# Patient Record
Sex: MALE | Race: WHITE | Employment: FULL TIME | URBAN - METROPOLITAN AREA
[De-identification: names, ages, dates, MRNs, and addresses within clinical notes are randomized per-mention and may not be internally consistent; named-entity substitution may affect disease eponyms.]

---

## 2021-12-06 ENCOUNTER — OFFICE VISIT (OUTPATIENT)
Dept: FAMILY MEDICINE CLINIC | Age: 26
End: 2021-12-06
Payer: COMMERCIAL

## 2021-12-06 VITALS
WEIGHT: 234.3 LBS | RESPIRATION RATE: 18 BRPM | BODY MASS INDEX: 31.74 KG/M2 | OXYGEN SATURATION: 100 % | SYSTOLIC BLOOD PRESSURE: 130 MMHG | DIASTOLIC BLOOD PRESSURE: 80 MMHG | HEIGHT: 72 IN | TEMPERATURE: 98 F | HEART RATE: 72 BPM

## 2021-12-06 DIAGNOSIS — Z13.1 SCREENING FOR DIABETES MELLITUS: ICD-10-CM

## 2021-12-06 DIAGNOSIS — F32.A DEPRESSION, UNSPECIFIED DEPRESSION TYPE: Primary | ICD-10-CM

## 2021-12-06 DIAGNOSIS — Z13.220 SCREENING, LIPID: ICD-10-CM

## 2021-12-06 PROCEDURE — G8427 DOCREV CUR MEDS BY ELIG CLIN: HCPCS | Performed by: FAMILY MEDICINE

## 2021-12-06 PROCEDURE — 1036F TOBACCO NON-USER: CPT | Performed by: FAMILY MEDICINE

## 2021-12-06 PROCEDURE — G8417 CALC BMI ABV UP PARAM F/U: HCPCS | Performed by: FAMILY MEDICINE

## 2021-12-06 PROCEDURE — G8484 FLU IMMUNIZE NO ADMIN: HCPCS | Performed by: FAMILY MEDICINE

## 2021-12-06 PROCEDURE — 99204 OFFICE O/P NEW MOD 45 MIN: CPT | Performed by: FAMILY MEDICINE

## 2021-12-06 RX ORDER — ARIPIPRAZOLE 2 MG/1
2 TABLET ORAL DAILY
COMMUNITY
End: 2021-12-06

## 2021-12-06 RX ORDER — FLUOXETINE HYDROCHLORIDE 20 MG/1
20 CAPSULE ORAL DAILY
Qty: 90 CAPSULE | Refills: 1 | Status: SHIPPED
Start: 2021-12-06 | End: 2022-06-01

## 2021-12-06 RX ORDER — FLUOXETINE HYDROCHLORIDE 20 MG/1
CAPSULE ORAL
COMMUNITY
End: 2021-12-06 | Stop reason: SDUPTHER

## 2021-12-06 RX ORDER — ARIPIPRAZOLE 2 MG/1
2 TABLET ORAL DAILY
Qty: 30 TABLET | Status: CANCELLED | OUTPATIENT
Start: 2021-12-06

## 2021-12-06 SDOH — ECONOMIC STABILITY: FOOD INSECURITY: WITHIN THE PAST 12 MONTHS, YOU WORRIED THAT YOUR FOOD WOULD RUN OUT BEFORE YOU GOT MONEY TO BUY MORE.: NEVER TRUE

## 2021-12-06 SDOH — ECONOMIC STABILITY: FOOD INSECURITY: WITHIN THE PAST 12 MONTHS, THE FOOD YOU BOUGHT JUST DIDN'T LAST AND YOU DIDN'T HAVE MONEY TO GET MORE.: NEVER TRUE

## 2021-12-06 ASSESSMENT — COLUMBIA-SUICIDE SEVERITY RATING SCALE - C-SSRS
5. HAVE YOU STARTED TO WORK OUT OR WORKED OUT THE DETAILS OF HOW TO KILL YOURSELF? DO YOU INTEND TO CARRY OUT THIS PLAN?: NO
2. HAVE YOU ACTUALLY HAD ANY THOUGHTS OF KILLING YOURSELF?: NO
3. HAVE YOU BEEN THINKING ABOUT HOW YOU MIGHT KILL YOURSELF?: NO
1. WITHIN THE PAST MONTH, HAVE YOU WISHED YOU WERE DEAD OR WISHED YOU COULD GO TO SLEEP AND NOT WAKE UP?: YES
4. HAVE YOU HAD THESE THOUGHTS AND HAD SOME INTENTION OF ACTING ON THEM?: NO
6. HAVE YOU EVER DONE ANYTHING, STARTED TO DO ANYTHING, OR PREPARED TO DO ANYTHING TO END YOUR LIFE?: NO

## 2021-12-06 ASSESSMENT — ENCOUNTER SYMPTOMS
EYE REDNESS: 0
RHINORRHEA: 0
SHORTNESS OF BREATH: 0
SINUS PAIN: 0
COUGH: 0
GASTROINTESTINAL NEGATIVE: 1
PHOTOPHOBIA: 0
EYE DISCHARGE: 0

## 2021-12-06 ASSESSMENT — SOCIAL DETERMINANTS OF HEALTH (SDOH): HOW HARD IS IT FOR YOU TO PAY FOR THE VERY BASICS LIKE FOOD, HOUSING, MEDICAL CARE, AND HEATING?: NOT HARD AT ALL

## 2021-12-06 ASSESSMENT — PATIENT HEALTH QUESTIONNAIRE - PHQ9
2. FEELING DOWN, DEPRESSED OR HOPELESS: 2
1. LITTLE INTEREST OR PLEASURE IN DOING THINGS: 3
8. MOVING OR SPEAKING SO SLOWLY THAT OTHER PEOPLE COULD HAVE NOTICED. OR THE OPPOSITE, BEING SO FIGETY OR RESTLESS THAT YOU HAVE BEEN MOVING AROUND A LOT MORE THAN USUAL: 0
SUM OF ALL RESPONSES TO PHQ QUESTIONS 1-9: 8
10. IF YOU CHECKED OFF ANY PROBLEMS, HOW DIFFICULT HAVE THESE PROBLEMS MADE IT FOR YOU TO DO YOUR WORK, TAKE CARE OF THINGS AT HOME, OR GET ALONG WITH OTHER PEOPLE: 2
6. FEELING BAD ABOUT YOURSELF - OR THAT YOU ARE A FAILURE OR HAVE LET YOURSELF OR YOUR FAMILY DOWN: 2
5. POOR APPETITE OR OVEREATING: 0
SUM OF ALL RESPONSES TO PHQ QUESTIONS 1-9: 10
9. THOUGHTS THAT YOU WOULD BE BETTER OFF DEAD, OR OF HURTING YOURSELF: 2
3. TROUBLE FALLING OR STAYING ASLEEP: 0
SUM OF ALL RESPONSES TO PHQ QUESTIONS 1-9: 10
SUM OF ALL RESPONSES TO PHQ9 QUESTIONS 1 & 2: 5
4. FEELING TIRED OR HAVING LITTLE ENERGY: 1
7. TROUBLE CONCENTRATING ON THINGS, SUCH AS READING THE NEWSPAPER OR WATCHING TELEVISION: 0

## 2021-12-06 NOTE — PROGRESS NOTES
2021    Dai Cage    Chief Complaint   Patient presents with   [facility] New Doctor    Depression     cannot reach psych for med refill       HPI  History was obtained from patient. Magdy Valerio is a 22 y.o. male who presents today with the followin. Depression, unspecified depression type    2. Screening, lipid    3. Screening for diabetes mellitus    Patient presents to establish primary care. Patient's only complaint today is depression. Patient states he has been treated for depression for a few years. He previously was on Prozac 20 mg daily and Abilify 2 mg daily. Since moving into the area he has not gotten reestablished and was unable to get refills from his previous provider in [place]. Patient states he has been borrowing some of his wife's Prozac that she is no longer taking to hold him over until he could get in to be seen. Patient states he was off for about a month and did not feel very good. He is back on the Prozac and is starting to feel better again. Patient denies any suicidal ideation. He is now sleeping well. REVIEW OF SYMPTOMS    Review of Systems   Constitutional: Negative for chills, fatigue and fever. HENT: Negative for congestion, mouth sores, postnasal drip, rhinorrhea and sinus pain. Eyes: Negative for photophobia, discharge and redness. Respiratory: Negative for cough and shortness of breath. Cardiovascular: Negative for chest pain. Gastrointestinal: Negative. Genitourinary: Negative for difficulty urinating. Neurological: Negative for headaches. Psychiatric/Behavioral: Positive for dysphoric mood. Negative for self-injury, sleep disturbance and suicidal ideas. PAST MEDICAL HISTORY  History reviewed. No pertinent past medical history. FAMILY HISTORY  History reviewed. No pertinent family history.     SOCIAL HISTORY  Social History     Socioeconomic History    Marital status: Single     Spouse name: None    Number of children: None    Years of education: None    Highest education level: None   Occupational History    None   Tobacco Use    Smoking status: Never Smoker    Smokeless tobacco: Never Used   Substance and Sexual Activity    Alcohol use: None    Drug use: None    Sexual activity: None   Other Topics Concern    None   Social History Narrative    None     Social Determinants of Health     Financial Resource Strain: Low Risk     Difficulty of Paying Living Expenses: Not hard at all   Food Insecurity: No Food Insecurity    Worried About Running Out of Food in the Last Year: Never true    Annabelle of Food in the Last Year: Never true   Transportation Needs:     Lack of Transportation (Medical): Not on file    Lack of Transportation (Non-Medical): Not on file   Physical Activity:     Days of Exercise per Week: Not on file    Minutes of Exercise per Session: Not on file   Stress:     Feeling of Stress : Not on file   Social Connections:     Frequency of Communication with Friends and Family: Not on file    Frequency of Social Gatherings with Friends and Family: Not on file    Attends Zoroastrian Services: Not on file    Active Member of [address] [place] [address] or Organizations: Not on file    Attends Club or Organization Meetings: Not on file    Marital Status: Not on file   Intimate Partner Violence:     Fear of Current or Ex-Partner: Not on file    Emotionally Abused: Not on file    Physically Abused: Not on file    Sexually Abused: Not on file   Housing Stability:     Unable to Pay for Housing in the Last Year: Not on file    Number of [place] in the Last Year: Not on file    Unstable Housing in the Last Year: Not on file        SURGICAL HISTORY  History reviewed. No pertinent surgical history.               CURRENT MEDICATIONS  Current Outpatient Medications   Medication Sig Dispense Refill    FLUoxetine (PROZAC) 20 MG capsule Take 1 capsule by mouth daily 90 capsule 1     No current facility-administered medications for this visit. ALLERGIES  Allergies   Allergen Reactions    Sulfa Antibiotics Rash       PHYSICAL EXAM    /80 (Site: Left Upper Arm, Position: Sitting, Cuff Size: Medium Adult)   Pulse 72   Temp 98 °F (36.7 °C) (Temporal)   Resp 18   Ht 6' (1.829 m)   Wt 234 lb 4.8 oz (106.3 kg)   SpO2 100%   BMI 31.78 kg/m²     Physical Exam  Vitals and nursing note reviewed. Constitutional:       Appearance: Normal appearance. HENT:      Right Ear: Ear canal normal.      Nose: No congestion or rhinorrhea. Mouth/Throat:      Mouth: Mucous membranes are moist.      Pharynx: Oropharynx is clear. Eyes:      General: No scleral icterus. Conjunctiva/sclera: Conjunctivae normal.   Cardiovascular:      Rate and Rhythm: Normal rate and regular rhythm. Heart sounds: Normal heart sounds. Pulmonary:      Effort: Pulmonary effort is normal.      Breath sounds: Normal breath sounds. Musculoskeletal:      Cervical back: Neck supple. Skin:     General: Skin is warm. Neurological:      Mental Status: He is alert and oriented to person, place, and time. Psychiatric:         Mood and Affect: Mood normal.         ASSESSMENT & PLAN    Austin Patel was seen today for established new doctor and depression. Diagnoses and all orders for this visit:    Depression, unspecified depression type    Screening, lipid  -     LIPID PANEL; Future    Screening for diabetes mellitus  -     COMPREHENSIVE METABOLIC PANEL; Future    Other orders  -     FLUoxetine (PROZAC) 20 MG capsule; Take 1 capsule by mouth daily    Patient will follow up in 2 weeks to see how he is doing on a steady dose of Prozac. He is advised that I do not prescribe Abilify. If he finds that he is going to require that medication will refer to a psychiatrist.    Return in about 2 weeks (around 12/20/2021). Electronically signed by Yobany Lu.  Jamie Camilo,  on 12/6/2021

## 2021-12-17 ENCOUNTER — TELEPHONE (OUTPATIENT)
Dept: FAMILY MEDICINE CLINIC | Age: 26
End: 2021-12-17

## 2021-12-17 NOTE — TELEPHONE ENCOUNTER
----- Message from Parlin Layer sent at 12/17/2021 11:12 AM EST -----  Subject: Message to Provider    QUESTIONS  Information for Provider? patient is coming in Monday for an appointment. he went to urgent care and has a non displaced fracture and they requested   he see an Ortho  but his insurance runs out the 1st of the year. So he   was wondering if he could see someone Monday about that also.  ---------------------------------------------------------------------------  --------------  CALL BACK INFO  What is the best way for the office to contact you? OK to leave message on   voicemail  Preferred Call Back Phone Number? [phone]  ---------------------------------------------------------------------------  --------------  SCRIPT ANSWERS  Relationship to Patient?  Self

## 2021-12-20 ENCOUNTER — OFFICE VISIT (OUTPATIENT)
Dept: FAMILY MEDICINE CLINIC | Age: 26
End: 2021-12-20
Payer: COMMERCIAL

## 2021-12-20 VITALS
HEART RATE: 67 BPM | DIASTOLIC BLOOD PRESSURE: 76 MMHG | SYSTOLIC BLOOD PRESSURE: 115 MMHG | RESPIRATION RATE: 20 BRPM | HEIGHT: 72 IN | OXYGEN SATURATION: 98 % | WEIGHT: 227 LBS | TEMPERATURE: 97.7 F | BODY MASS INDEX: 30.75 KG/M2

## 2021-12-20 DIAGNOSIS — S62.645D CLOSED NONDISPLACED FRACTURE OF PROXIMAL PHALANX OF LEFT RING FINGER WITH ROUTINE HEALING, SUBSEQUENT ENCOUNTER: ICD-10-CM

## 2021-12-20 DIAGNOSIS — F32.A DEPRESSION, UNSPECIFIED DEPRESSION TYPE: Primary | ICD-10-CM

## 2021-12-20 PROCEDURE — G8427 DOCREV CUR MEDS BY ELIG CLIN: HCPCS | Performed by: FAMILY MEDICINE

## 2021-12-20 PROCEDURE — 99214 OFFICE O/P EST MOD 30 MIN: CPT | Performed by: FAMILY MEDICINE

## 2021-12-20 PROCEDURE — G8417 CALC BMI ABV UP PARAM F/U: HCPCS | Performed by: FAMILY MEDICINE

## 2021-12-20 PROCEDURE — 1036F TOBACCO NON-USER: CPT | Performed by: FAMILY MEDICINE

## 2021-12-20 PROCEDURE — G8484 FLU IMMUNIZE NO ADMIN: HCPCS | Performed by: FAMILY MEDICINE

## 2021-12-20 ASSESSMENT — ENCOUNTER SYMPTOMS
RHINORRHEA: 0
SINUS PAIN: 0
EYE DISCHARGE: 0
PHOTOPHOBIA: 0
SHORTNESS OF BREATH: 0
EYE REDNESS: 0
COUGH: 0

## 2021-12-20 NOTE — PROGRESS NOTES
Thomas Alvarado (:  1995) is a 22 y.o. male,Established patient, here for evaluation of the following chief complaint(s):  Depression (2 week follow up)         ASSESSMENT/PLAN:  1. Depression, unspecified depression type  2. Closed nondisplaced fracture of proximal phalanx of left ring finger with routine healing, subsequent encounter  -     [address] [place] [address]  I have recommended that patient get a disc with his x-ray on it to take to the orthopedist.  Patient's health insurance expires in 3 days as he turns 32 and does not have insurance at work. He would still like to be evaluated to make sure that his finger is healing properly. Return in about 3 months (around 3/20/2022). Subjective   SUBJECTIVE/OBJECTIVE:  Patient is here for recheck of depression. At patient's last visit he was started on Prozac 20 mg daily. Patient states he is feeling much better and he has had no side effects on the medication patient's other problem today is a broken left index finger. He states that while playing basketball he stubbed his finger was seen in urgent care and diagnosed with a nondisplaced fracture of the left ring finger. He currently has his finger in a splint. He is having minimal pain at this time. Review of Systems   Constitutional: Negative for chills, fatigue and fever. HENT: Negative for congestion, mouth sores, postnasal drip, rhinorrhea and sinus pain. Eyes: Negative for photophobia, discharge and redness. Respiratory: Negative for cough and shortness of breath. Cardiovascular: Negative for chest pain. Genitourinary: Negative for difficulty urinating. Neurological: Negative for headaches. Psychiatric/Behavioral: Negative for dysphoric mood and sleep disturbance. The patient is not nervous/anxious. Objective   Physical Exam  Vitals and nursing note reviewed.    Constitutional:       Appearance: Normal appearance. HENT:      Head: Normocephalic and atraumatic. Eyes:      General: No scleral icterus. Conjunctiva/sclera: Conjunctivae normal.   Musculoskeletal:      Comments: Patient's left ring finger is in a aluminum splint and wrapped with Ace bandage. Neurological:      Mental Status: He is alert and oriented to person, place, and time. Psychiatric:         Mood and Affect: Mood normal.                  An electronic signature was used to authenticate this note. --Breezy Ambriz.  Beto Bourne

## 2022-03-25 ENCOUNTER — HOSPITAL ENCOUNTER (INPATIENT)
Age: 27
LOS: 3 days | Discharge: HOME HEALTH | DRG: 312 | End: 2022-03-28
Attending: EMERGENCY MEDICINE | Admitting: INTERNAL MEDICINE
Payer: COMMERCIAL

## 2022-03-25 ENCOUNTER — APPOINTMENT (OUTPATIENT)
Dept: CT IMAGING | Age: 27
DRG: 312 | End: 2022-03-25
Payer: COMMERCIAL

## 2022-03-25 ENCOUNTER — APPOINTMENT (OUTPATIENT)
Dept: GENERAL RADIOLOGY | Age: 27
DRG: 312 | End: 2022-03-25
Payer: COMMERCIAL

## 2022-03-25 DIAGNOSIS — R93.89 ABNORMAL CHEST CT: ICD-10-CM

## 2022-03-25 DIAGNOSIS — J98.4 PNEUMATOCELE OF LUNG: ICD-10-CM

## 2022-03-25 DIAGNOSIS — R55 SYNCOPE AND COLLAPSE: Primary | ICD-10-CM

## 2022-03-25 DIAGNOSIS — E86.0 DEHYDRATION: ICD-10-CM

## 2022-03-25 LAB
ALBUMIN SERPL-MCNC: 4.6 G/DL (ref 3.5–5.2)
ALP BLD-CCNC: 94 U/L (ref 40–129)
ALT SERPL-CCNC: 46 U/L (ref 0–40)
AMPHETAMINE SCREEN, URINE: NOT DETECTED
ANION GAP SERPL CALCULATED.3IONS-SCNC: 11 MMOL/L (ref 7–16)
AST SERPL-CCNC: 33 U/L (ref 0–39)
BARBITURATE SCREEN URINE: NOT DETECTED
BASOPHILS ABSOLUTE: 0.04 E9/L (ref 0–0.2)
BASOPHILS RELATIVE PERCENT: 0.6 % (ref 0–2)
BENZODIAZEPINE SCREEN, URINE: NOT DETECTED
BILIRUB SERPL-MCNC: 0.3 MG/DL (ref 0–1.2)
BUN BLDV-MCNC: 11 MG/DL (ref 6–20)
CALCIUM SERPL-MCNC: 9.6 MG/DL (ref 8.6–10.2)
CANNABINOID SCREEN URINE: NOT DETECTED
CHLORIDE BLD-SCNC: 102 MMOL/L (ref 98–107)
CHP ED QC CHECK: NORMAL
CO2: 21 MMOL/L (ref 22–29)
COCAINE METABOLITE SCREEN URINE: NOT DETECTED
CREAT SERPL-MCNC: 1.1 MG/DL (ref 0.7–1.2)
D DIMER: <200 NG/ML DDU
EOSINOPHILS ABSOLUTE: 0.02 E9/L (ref 0.05–0.5)
EOSINOPHILS RELATIVE PERCENT: 0.3 % (ref 0–6)
FENTANYL SCREEN, URINE: NOT DETECTED
GFR AFRICAN AMERICAN: >60
GFR NON-AFRICAN AMERICAN: >60 ML/MIN/1.73
GLUCOSE BLD-MCNC: 112 MG/DL
GLUCOSE BLD-MCNC: 118 MG/DL (ref 74–99)
HCT VFR BLD CALC: 46.7 % (ref 37–54)
HEMOGLOBIN: 16.1 G/DL (ref 12.5–16.5)
IMMATURE GRANULOCYTES #: 0.02 E9/L
IMMATURE GRANULOCYTES %: 0.3 % (ref 0–5)
LACTIC ACID: 1.9 MMOL/L (ref 0.5–2.2)
LYMPHOCYTES ABSOLUTE: 1.44 E9/L (ref 1.5–4)
LYMPHOCYTES RELATIVE PERCENT: 21.7 % (ref 20–42)
Lab: NORMAL
MCH RBC QN AUTO: 28.9 PG (ref 26–35)
MCHC RBC AUTO-ENTMCNC: 34.5 % (ref 32–34.5)
MCV RBC AUTO: 83.7 FL (ref 80–99.9)
METER GLUCOSE: 112 MG/DL (ref 74–99)
METHADONE SCREEN, URINE: NOT DETECTED
MONOCYTES ABSOLUTE: 0.57 E9/L (ref 0.1–0.95)
MONOCYTES RELATIVE PERCENT: 8.6 % (ref 2–12)
NEUTROPHILS ABSOLUTE: 4.55 E9/L (ref 1.8–7.3)
NEUTROPHILS RELATIVE PERCENT: 68.5 % (ref 43–80)
OPIATE SCREEN URINE: NOT DETECTED
OXYCODONE URINE: NOT DETECTED
PDW BLD-RTO: 12.9 FL (ref 11.5–15)
PHENCYCLIDINE SCREEN URINE: NOT DETECTED
PLATELET # BLD: 236 E9/L (ref 130–450)
PMV BLD AUTO: 9.8 FL (ref 7–12)
POTASSIUM REFLEX MAGNESIUM: 4.9 MMOL/L (ref 3.5–5)
RBC # BLD: 5.58 E12/L (ref 3.8–5.8)
REASON FOR REJECTION: NORMAL
REJECTED TEST: NORMAL
SEDIMENTATION RATE, ERYTHROCYTE: 5 MM/HR (ref 0–15)
SODIUM BLD-SCNC: 134 MMOL/L (ref 132–146)
TOTAL PROTEIN: 7.6 G/DL (ref 6.4–8.3)
TROPONIN, HIGH SENSITIVITY: <6 NG/L (ref 0–11)
TSH SERPL DL<=0.05 MIU/L-ACNC: 0.92 UIU/ML (ref 0.27–4.2)
WBC # BLD: 6.6 E9/L (ref 4.5–11.5)

## 2022-03-25 PROCEDURE — 1200000000 HC SEMI PRIVATE

## 2022-03-25 PROCEDURE — 93005 ELECTROCARDIOGRAM TRACING: CPT | Performed by: EMERGENCY MEDICINE

## 2022-03-25 PROCEDURE — 2580000003 HC RX 258: Performed by: INTERNAL MEDICINE

## 2022-03-25 PROCEDURE — 85651 RBC SED RATE NONAUTOMATED: CPT

## 2022-03-25 PROCEDURE — 83605 ASSAY OF LACTIC ACID: CPT

## 2022-03-25 PROCEDURE — 80053 COMPREHEN METABOLIC PANEL: CPT

## 2022-03-25 PROCEDURE — 99223 1ST HOSP IP/OBS HIGH 75: CPT | Performed by: INTERNAL MEDICINE

## 2022-03-25 PROCEDURE — 71046 X-RAY EXAM CHEST 2 VIEWS: CPT

## 2022-03-25 PROCEDURE — 36415 COLL VENOUS BLD VENIPUNCTURE: CPT

## 2022-03-25 PROCEDURE — 96360 HYDRATION IV INFUSION INIT: CPT

## 2022-03-25 PROCEDURE — 6360000004 HC RX CONTRAST MEDICATION: Performed by: RADIOLOGY

## 2022-03-25 PROCEDURE — 80307 DRUG TEST PRSMV CHEM ANLYZR: CPT

## 2022-03-25 PROCEDURE — 84443 ASSAY THYROID STIM HORMONE: CPT

## 2022-03-25 PROCEDURE — 85025 COMPLETE CBC W/AUTO DIFF WBC: CPT

## 2022-03-25 PROCEDURE — 99285 EMERGENCY DEPT VISIT HI MDM: CPT

## 2022-03-25 PROCEDURE — 84484 ASSAY OF TROPONIN QUANT: CPT

## 2022-03-25 PROCEDURE — 82962 GLUCOSE BLOOD TEST: CPT

## 2022-03-25 PROCEDURE — 71260 CT THORAX DX C+: CPT

## 2022-03-25 PROCEDURE — 2580000003 HC RX 258: Performed by: EMERGENCY MEDICINE

## 2022-03-25 PROCEDURE — 85378 FIBRIN DEGRADE SEMIQUANT: CPT

## 2022-03-25 RX ORDER — 0.9 % SODIUM CHLORIDE 0.9 %
1000 INTRAVENOUS SOLUTION INTRAVENOUS ONCE
Status: COMPLETED | OUTPATIENT
Start: 2022-03-25 | End: 2022-03-25

## 2022-03-25 RX ORDER — ACETAMINOPHEN 325 MG/1
650 TABLET ORAL EVERY 6 HOURS PRN
Status: DISCONTINUED | OUTPATIENT
Start: 2022-03-25 | End: 2022-03-28 | Stop reason: HOSPADM

## 2022-03-25 RX ORDER — ONDANSETRON 4 MG/1
4 TABLET, ORALLY DISINTEGRATING ORAL EVERY 8 HOURS PRN
Status: DISCONTINUED | OUTPATIENT
Start: 2022-03-25 | End: 2022-03-28 | Stop reason: HOSPADM

## 2022-03-25 RX ORDER — ONDANSETRON 2 MG/ML
4 INJECTION INTRAMUSCULAR; INTRAVENOUS EVERY 6 HOURS PRN
Status: DISCONTINUED | OUTPATIENT
Start: 2022-03-25 | End: 2022-03-28 | Stop reason: HOSPADM

## 2022-03-25 RX ORDER — ACETAMINOPHEN 650 MG/1
650 SUPPOSITORY RECTAL EVERY 6 HOURS PRN
Status: DISCONTINUED | OUTPATIENT
Start: 2022-03-25 | End: 2022-03-28 | Stop reason: HOSPADM

## 2022-03-25 RX ORDER — SODIUM CHLORIDE 0.9 % (FLUSH) 0.9 %
5-40 SYRINGE (ML) INJECTION EVERY 12 HOURS SCHEDULED
Status: DISCONTINUED | OUTPATIENT
Start: 2022-03-25 | End: 2022-03-27 | Stop reason: SDUPTHER

## 2022-03-25 RX ORDER — SODIUM CHLORIDE 0.9 % (FLUSH) 0.9 %
5-40 SYRINGE (ML) INJECTION PRN
Status: DISCONTINUED | OUTPATIENT
Start: 2022-03-25 | End: 2022-03-27 | Stop reason: SDUPTHER

## 2022-03-25 RX ORDER — 0.9 % SODIUM CHLORIDE 0.9 %
1000 INTRAVENOUS SOLUTION INTRAVENOUS ONCE
Status: DISCONTINUED | OUTPATIENT
Start: 2022-03-25 | End: 2022-03-25

## 2022-03-25 RX ORDER — SODIUM CHLORIDE 9 MG/ML
25 INJECTION, SOLUTION INTRAVENOUS PRN
Status: DISCONTINUED | OUTPATIENT
Start: 2022-03-25 | End: 2022-03-27 | Stop reason: SDUPTHER

## 2022-03-25 RX ORDER — SODIUM CHLORIDE, SODIUM LACTATE, POTASSIUM CHLORIDE, CALCIUM CHLORIDE 600; 310; 30; 20 MG/100ML; MG/100ML; MG/100ML; MG/100ML
INJECTION, SOLUTION INTRAVENOUS CONTINUOUS
Status: ACTIVE | OUTPATIENT
Start: 2022-03-25 | End: 2022-03-27

## 2022-03-25 RX ORDER — POLYETHYLENE GLYCOL 3350 17 G/17G
17 POWDER, FOR SOLUTION ORAL DAILY PRN
Status: DISCONTINUED | OUTPATIENT
Start: 2022-03-25 | End: 2022-03-28 | Stop reason: HOSPADM

## 2022-03-25 RX ADMIN — SODIUM CHLORIDE 1000 ML: 9 INJECTION, SOLUTION INTRAVENOUS at 12:06

## 2022-03-25 RX ADMIN — SODIUM CHLORIDE, POTASSIUM CHLORIDE, SODIUM LACTATE AND CALCIUM CHLORIDE: 600; 310; 30; 20 INJECTION, SOLUTION INTRAVENOUS at 21:25

## 2022-03-25 RX ADMIN — SODIUM CHLORIDE, PRESERVATIVE FREE 10 ML: 5 INJECTION INTRAVENOUS at 21:17

## 2022-03-25 RX ADMIN — IOPAMIDOL 75 ML: 755 INJECTION, SOLUTION INTRAVENOUS at 14:08

## 2022-03-25 ASSESSMENT — ENCOUNTER SYMPTOMS
SORE THROAT: 0
COLOR CHANGE: 0
CHOKING: 0
VOMITING: 0
NAUSEA: 0
DIARRHEA: 0
ABDOMINAL PAIN: 0
CHEST TIGHTNESS: 0
CONSTIPATION: 0
BLOOD IN STOOL: 0
COUGH: 0
SHORTNESS OF BREATH: 0

## 2022-03-25 NOTE — PROGRESS NOTES
Database initiated pharmacy and medications verified with the patient. He is A&O independent from home.

## 2022-03-25 NOTE — ED NOTES
Pt had syncopal episode for 10 seconds, witnessed by this nurse at [phone]. A&o immediately afterwards. Dr. Angy Pickens notified immediately afterwards.       Onofre Fraction, RN  03/25/22 Fredi [id number]       Onofre Fraction, RN  03/25/22 4630

## 2022-03-25 NOTE — ED PROVIDER NOTES
[address]      Pt Name: Abigail Don  MRN: [id number]  [place] 1995  Date of evaluation: 3/25/2022      CHIEF COMPLAINT       Chief Complaint   Patient presents with    Loss of Consciousness     pt was said to be at work and while sitting at desk, was slouched over and \"out of it\". pt appeared to have palor present and was diaphoretic. pt states he feels fine at this time. history of vasovagal sydrome. HPI  Abigail Don is a 32 y.o. male  with PMHx of vasovagal syndrome presents after LOC at work. 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 Denies LOC or head trauma. States coworker saw him right after and he was pale and diaphoretic. Denies any symptoms currently. He states he was watching a video clip of someone gasping for air (CPR training) and believes he may have gotten triggered by that. He states in 2015 he passed out after an intense workout. Describes symptoms moderate in severity with no alleviating or exacerbating factors. Denies any fever, chills, n/v, headache, dizziness, weakness, cp, palpitations, leg swelling or tenderness, sob, cough, abd pain, dysuria, hematuria, diarrhea, constipation. Patient states he ate this morning but not enough fluids today. Except as noted above the remainder of the review of systems was reviewed and negative. Review of Systems   Constitutional: Negative for appetite change, chills, fatigue and fever. HENT: Negative for congestion and sore throat. Eyes: Negative for visual disturbance. Respiratory: Negative for cough, choking, chest tightness and shortness of breath. Cardiovascular: Negative for chest pain, palpitations and leg swelling. Gastrointestinal: Negative for abdominal pain, blood in stool, constipation, diarrhea, nausea and vomiting. Endocrine: Negative for polyphagia. Genitourinary: Negative for decreased urine volume, difficulty urinating, flank pain and hematuria. Musculoskeletal: Negative for arthralgias, gait problem, joint swelling and myalgias. Skin: Negative for color change, pallor, rash and wound. Neurological: Positive for syncope. Negative for dizziness, tremors, seizures, weakness, light-headedness, numbness and headaches. Hematological: Negative for adenopathy. Does not bruise/bleed easily. Psychiatric/Behavioral: Negative for confusion and hallucinations. All other systems reviewed and are negative. Physical Exam  Vitals reviewed. Constitutional:       General: He is not in acute distress. Appearance: Normal appearance. He is normal weight. He is not ill-appearing, toxic-appearing or diaphoretic. HENT:      Head: Normocephalic and atraumatic. Right Ear: External ear normal.      Left Ear: External ear normal.      Nose: Nose normal. No congestion or rhinorrhea. Mouth/Throat:      Mouth: Mucous membranes are moist.      Pharynx: Oropharynx is clear. No oropharyngeal exudate or posterior oropharyngeal erythema. Eyes:      Extraocular Movements: Extraocular movements intact. Conjunctiva/sclera: Conjunctivae normal.      Pupils: Pupils are equal, round, and reactive to light. Cardiovascular:      Rate and Rhythm: Normal rate and regular rhythm. Pulses: Normal pulses. Pulmonary:      Effort: Pulmonary effort is normal. No respiratory distress. Breath sounds: Normal breath sounds. No wheezing or rhonchi. Chest:      Chest wall: No tenderness. Abdominal:      General: Abdomen is flat. Bowel sounds are normal. There is no distension. Palpations: Abdomen is soft. Tenderness: There is no abdominal tenderness. There is no right CVA tenderness, left CVA tenderness or guarding. Hernia: No hernia is present.    Musculoskeletal:      Cervical back: Normal range of motion. Right lower leg: No edema. Left lower leg: No edema. Skin:     General: Skin is warm and dry. Capillary Refill: Capillary refill takes less than 2 seconds. Neurological:      General: No focal deficit present. Mental Status: He is alert and oriented to person, place, and time. Mental status is at baseline. Cranial Nerves: No cranial nerve deficit. Sensory: No sensory deficit. Motor: No weakness. Coordination: Coordination normal.   Psychiatric:         Mood and Affect: Mood normal.         Behavior: Behavior normal.         Thought Content: Thought content normal.         Judgment: Judgment normal.          Procedures     MDM      32 y.o. male  with PMHx of vasovagal syndrome presents after LOC at work. 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 While in the ED patient was hemodynamically stable, afebrile, nontoxic-appearing, in no respiratory distress. Physical exam unremarkable. Labs unremarkable, had no leukocytosis and normal electrolytes no signs of anemia. EKG normal sinus with early repolarization but no sign of acute ischemia. CXR remarkable for air-fluid level seen within the right perihilar region which could  represent a cavitary lesion. CT chest remarkable for 4.7 cm thin-walled cavitary with air-fluid level at the right hilar region without associated inflammatory findings  adjacent or separate nodule or mass. Patient was asymptomatic throughout his stay, he received fluids. Cardiology was consulted and they cleared patient for discharge. .  Pulmonologist consulted and they would like patient to be admitted for observation with further work-up of cavitary lesion. Patient admitted to the Medicine team for further management. Patient in agreement with plan of admission.          ED Course as of 03/25/22 2044   Fri Mar 25, 2022   8298 Spoke to  Jcarlos Obrien, states he is not concerned about EKG and that patient probably just need fluids. He states patient can just follow up with cardiologist that completed full unremarkable workup at [address]. [TC]   1722 Spoke to Dr. Katrina Tolbert, he would like patient admitted to at least obs. He will follow [TC]   1730 EKG: This EKG is signed by emergency department physician. Rate: 80  Rhythm: Sinus  Interpretation: non-specific EKG, Early repol  Comparison: stable as compared to patient's most recent EKG      [TC]      ED Course User Index  [TC] Ginny Ramos MD       --------------------------------------------- PAST HISTORY ---------------------------------------------  Past Medical History:  has no past medical history on file. Past Surgical History:  has no past surgical history on file. Social History:  reports that he has never smoked. He has never used smokeless tobacco.    Family History: family history is not on file. The patients home medications have been reviewed.     Allergies: Sulfa antibiotics    -------------------------------------------------- RESULTS -------------------------------------------------    LABS:  Results for orders placed or performed during the hospital encounter of 03/25/22   Lactic Acid   Result Value Ref Range    Lactic Acid 1.9 0.5 - 2.2 mmol/L   CBC with Auto Differential   Result Value Ref Range    WBC 6.6 4.5 - 11.5 E9/L    RBC 5.58 3.80 - 5.80 E12/L    Hemoglobin 16.1 12.5 - 16.5 g/dL    Hematocrit 46.7 37.0 - 54.0 %    MCV 83.7 80.0 - 99.9 fL    MCH 28.9 26.0 - 35.0 pg    MCHC 34.5 32.0 - 34.5 %    RDW 12.9 11.5 - 15.0 fL    Platelets 291 010 - 465 E9/L    MPV 9.8 7.0 - 12.0 fL    Neutrophils % 68.5 43.0 - 80.0 %    Immature Granulocytes % 0.3 0.0 - 5.0 %    Lymphocytes % 21.7 20.0 - 42.0 %    Monocytes % 8.6 2.0 - 12.0 %    Eosinophils % 0.3 0.0 - 6.0 %    Basophils % 0.6 0.0 - 2.0 %    Neutrophils Absolute 4.55 1.80 - 7.30 E9/L    Immature Granulocytes # 0.02 E9/L Lymphocytes Absolute 1.44 (L) 1.50 - 4.00 E9/L    Monocytes Absolute 0.57 0.10 - 0.95 E9/L    Eosinophils Absolute 0.02 (L) 0.05 - 0.50 E9/L    Basophils Absolute 0.04 0.00 - 0.20 E9/L   Comprehensive Metabolic Panel w/ Reflex to MG   Result Value Ref Range    Sodium 134 132 - 146 mmol/L    Potassium reflex Magnesium 4.9 3.5 - 5.0 mmol/L    Chloride 102 98 - 107 mmol/L    CO2 21 (L) 22 - 29 mmol/L    Anion Gap 11 7 - 16 mmol/L    Glucose 118 (H) 74 - 99 mg/dL    BUN 11 6 - 20 mg/dL    CREATININE 1.1 0.7 - 1.2 mg/dL    GFR Non-African American >60 >=60 mL/min/1.73    GFR African American >60     Calcium 9.6 8.6 - 10.2 mg/dL    Total Protein 7.6 6.4 - 8.3 g/dL    Albumin 4.6 3.5 - 5.2 g/dL    Total Bilirubin 0.3 0.0 - 1.2 mg/dL    Alkaline Phosphatase 94 40 - 129 U/L    ALT 46 (H) 0 - 40 U/L    AST 33 0 - 39 U/L   D-Dimer, Quantitative   Result Value Ref Range    D-Dimer, Quant <200 ng/mL DDU   SPECIMEN REJECTION   Result Value Ref Range    Rejected Test Trop     Reason for Rejection see below    Troponin   Result Value Ref Range    Troponin, High Sensitivity <6 0 - 11 ng/L   POCT Glucose   Result Value Ref Range    Glucose 112 mg/dL    QC OK? y    POCT Glucose   Result Value Ref Range    Meter Glucose 112 (H) 74 - 99 mg/dL   EKG 12 Lead   Result Value Ref Range    Ventricular Rate 80 BPM    Atrial Rate 80 BPM    P-R Interval 136 ms    QRS Duration 98 ms    Q-T Interval 374 ms    QTc Calculation (Bazett) 431 ms    P Axis 73 degrees    R Axis 82 degrees    T Axis 65 degrees   EKG 12 Lead   Result Value Ref Range    Ventricular Rate 65 BPM    Atrial Rate 65 BPM    P-R Interval 138 ms    QRS Duration 96 ms    Q-T Interval 392 ms    QTc Calculation (Bazett) 407 ms    P Axis 69 degrees    R Axis 72 degrees    T Axis 65 degrees       RADIOLOGY:  CT CHEST W CONTRAST   Preliminary Result   Indeterminate 4.7 cm thin-walled cavitary or cystic focus with air-fluid   level at the right hilar region without associated inflammatory findings   adjacent or separate nodule or mass. Considerations for air-fluid level   within a pneumatocele. XR CHEST (2 VW)   Final Result   1. Air-fluid level seen within the right perihilar region which could   represent a cavitary lesion. Dedicated CT of the thorax is recommended with   IV contrast.                 ------------------------- NURSING NOTES AND VITALS REVIEWED ---------------------------  Date / Time Roomed:  3/25/2022 11:01 AM  ED Bed Assignment:  72/44    The nursing notes within the ED encounter and vital signs as below have been reviewed. Patient Vitals for the past 24 hrs:   BP Temp Temp src Pulse Resp SpO2 Height Weight   03/25/22 1306 119/64 -- -- 75 16 98 % -- --   03/25/22 1009 129/66 98.2 °F (36.8 °C) Temporal 84 14 98 % 6' (1.829 m) 230 lb (104.3 kg)       Oxygen Saturation Interpretation: Normal    ------------------------------------------ PROGRESS NOTES ------------------------------------------  Re-evaluation(s):  Time: 1130  Patients symptoms show no change  Repeat physical examination is not changed    Counseling:  I have spoken with the patient and discussed todays results, in addition to providing specific details for the plan of care and counseling regarding the diagnosis and prognosis. Their questions are answered at this time and they are agreeable with the plan of admission.    --------------------------------- ADDITIONAL PROVIDER NOTES ---------------------------------  Consultations:   Spoke with Dr. Desiree Stinson. Discussed case. They will admit the patient. This patient's ED course included: a personal history and physicial examination, re-evaluation prior to disposition, multiple bedside re-evaluations, IV medications, cardiac monitoring and continuous pulse oximetry    This patient has remained hemodynamically stable during their ED course. Diagnosis:  1. Syncope and collapse    2. Dehydration    3. Abnormal chest CT    4.  Pneumatocele of lung        Disposition:  Patient's disposition: Admit to telemetry  Patient's condition is stable.          Wero Gu MD  Resident  03/26/22 8014

## 2022-03-25 NOTE — ED NOTES
TASHIA faxed to floor. Spoke to [employer] who stated they received it. Pt ready.      Paul Valdes RN  03/25/22 9065

## 2022-03-25 NOTE — H&P
Internal Medicine HISTORY AND PHYSICAL EXAMINATION            Date:   3/25/2022  Patient name:  Ryan Ennis  Date of admission:  3/25/2022 11:01 AM  MRN:   [id number]  Account:  [de-identified]  YOB: 1995  PCP:    Antonella Kelsey. Marianela Rojas DO  Room:   33/33  Code Status:    Full Code    Chief Complaint:     Chief Complaint   Patient presents with    Loss of Consciousness     pt was said to be at work and while sitting at desk, was slouched over and \"out of it\". pt appeared to have palor present and was diaphoretic. pt states he feels fine at this time. history of vasovagal sydrome. History of Present Illness:   Ryan Ennis is a 32 y.o. male  with PMHx of vasovagal syndrome presents after LOC at work. 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 Denies LOC or head trauma. States coworker saw him right after and he was pale and diaphoretic. Denies any symptoms currently. He states he was watching a video clip of someone gasping for air (CPR training) and believes he may have gotten triggered by that. He states in 2015 he passed out after an intense workout. Describes symptoms moderate in severity with no alleviating or exacerbating factors. Denies any fever, chills, n/v, headache, dizziness, weakness, cp, palpitations, leg swelling or tenderness, sob, cough, abd pain, dysuria, hematuria, diarrhea, constipation. Patient states he ate this morning but not enough fluids today. While in the ED, patient had a syncopal episode while laying down for ~10 seconds. No post ictal state. Very similar. Past Medical History:     PMhx of vasovagal syncope    Past Surgical History:     History reviewed. No pertinent surgical history. Medications Prior to Admission:     Prior to Admission medications    Medication Sig Start Date End Date Taking?  Authorizing Provider   FLUoxetine (PROZAC) 20 MG capsule Take 1 capsule by mouth daily 21   Axel Leary DO        Allergies:     Sulfa antibiotics    Social History:     Tobacco:    reports that he has never smoked. He has never used smokeless tobacco.  Alcohol:      has no history on file for alcohol use. Drug Use:  has no history on file for drug use. Family History:     History reviewed. No pertinent family history. Review of Systems:     ROS Positive and Negative as described in HPI. Physical Exam:   /64   Pulse 75   Temp 98.2 °F (36.8 °C) (Temporal)   Resp 16   Ht 6' (1.829 m)   Wt 230 lb (104.3 kg)   SpO2 98%   BMI 31.19 kg/m²   Temp (24hrs), Av.2 °F (36.8 °C), Min:98.2 °F (36.8 °C), Max:98.2 °F (36.8 °C)    No results for input(s): POCGLU in the last 72 hours. Intake/Output Summary (Last 24 hours) at 3/25/2022 1739  Last data filed at 3/25/2022 1306  Gross per 24 hour   Intake 1000 ml   Output --   Net 1000 ml     Body habitus limiting physical exam     General Appearance: alert, well appearing, and in no acute distress, speaking in full sentences. Mental status: oriented to person, place, and time  Head: normocephalic, atraumatic  Eye: no icterus, redness, pupils equal and reactive, extraocular eye movements intact, conjunctiva clear  Ear: normal external ear, no discharge, hearing intact  Nose: no drainage noted  Mouth: mucous membranes moist  Neck: supple, no carotid bruits, thyroid not palpable  Lungs: Bilateral equal air entry, clear to ausculation, no wheezing, rales or rhonchi, normal effort  Cardiovascular: normal rate, regular rhythm, unable to appreciate any murmers, nml s1,s2. Possible PMI.  Limbs normothermic and noedematous  Abdomen: Soft, nontender, nondistended, normal bowel sounds, no hepatomegaly or splenomegaly  Neurologic: There are no new focal motor or sensory deficits, normal muscle tone and bulk, no abnormal sensation, normal speech, cranial nerves II through XII grossly intact  Skin: No gross lesions, rashes, bruising or bleeding on exposed skin area  Extremities: peripheral pulses palpable, no pedal edema or calf pain with palpation  Psych: normal affect    Investigations:      Laboratory Testing:  Recent Results (from the past 24 hour(s))   EKG 12 Lead    Collection Time: 03/25/22 10:42 AM   Result Value Ref Range    Ventricular Rate 80 BPM    Atrial Rate 80 BPM    P-R Interval 136 ms    QRS Duration 98 ms    Q-T Interval 374 ms    QTc Calculation (Bazett) 431 ms    P Axis 73 degrees    R Axis 82 degrees    T Axis 65 degrees   Lactic Acid    Collection Time: 03/25/22 12:00 PM   Result Value Ref Range    Lactic Acid 1.9 0.5 - 2.2 mmol/L   CBC with Auto Differential    Collection Time: 03/25/22 12:00 PM   Result Value Ref Range    WBC 6.6 4.5 - 11.5 E9/L    RBC 5.58 3.80 - 5.80 E12/L    Hemoglobin 16.1 12.5 - 16.5 g/dL    Hematocrit 46.7 37.0 - 54.0 %    MCV 83.7 80.0 - 99.9 fL    MCH 28.9 26.0 - 35.0 pg    MCHC 34.5 32.0 - 34.5 %    RDW 12.9 11.5 - 15.0 fL    Platelets 714 202 - 548 E9/L    MPV 9.8 7.0 - 12.0 fL    Neutrophils % 68.5 43.0 - 80.0 %    Immature Granulocytes % 0.3 0.0 - 5.0 %    Lymphocytes % 21.7 20.0 - 42.0 %    Monocytes % 8.6 2.0 - 12.0 %    Eosinophils % 0.3 0.0 - 6.0 %    Basophils % 0.6 0.0 - 2.0 %    Neutrophils Absolute 4.55 1.80 - 7.30 E9/L    Immature Granulocytes # 0.02 E9/L    Lymphocytes Absolute 1.44 (L) 1.50 - 4.00 E9/L    Monocytes Absolute 0.57 0.10 - 0.95 E9/L    Eosinophils Absolute 0.02 (L) 0.05 - 0.50 E9/L    Basophils Absolute 0.04 0.00 - 0.20 E9/L   Comprehensive Metabolic Panel w/ Reflex to MG    Collection Time: 03/25/22 12:00 PM   Result Value Ref Range    Sodium 134 132 - 146 mmol/L    Potassium reflex Magnesium 4.9 3.5 - 5.0 mmol/L    Chloride 102 98 - 107 mmol/L    CO2 21 (L) 22 - 29 mmol/L    Anion Gap 11 7 - 16 mmol/L    Glucose 118 (H) 74 - 99 mg/dL    BUN 11 6 - 20 mg/dL    CREATININE 1.1 0.7 - 1.2 mg/dL    GFR Non-African American >60 >=60 mL/min/1.73 GFR African American >60     Calcium 9.6 8.6 - 10.2 mg/dL    Total Protein 7.6 6.4 - 8.3 g/dL    Albumin 4.6 3.5 - 5.2 g/dL    Total Bilirubin 0.3 0.0 - 1.2 mg/dL    Alkaline Phosphatase 94 40 - 129 U/L    ALT 46 (H) 0 - 40 U/L    AST 33 0 - 39 U/L   POCT Glucose    Collection Time: 03/25/22 12:23 PM   Result Value Ref Range    Meter Glucose 112 (H) 74 - 99 mg/dL   D-Dimer, Quantitative    Collection Time: 03/25/22 12:28 PM   Result Value Ref Range    D-Dimer, Quant <200 ng/mL DDU   POCT Glucose    Collection Time: 03/25/22 12:30 PM   Result Value Ref Range    Glucose 112 mg/dL    QC OK? y    SPECIMEN REJECTION    Collection Time: 03/25/22 12:45 PM   Result Value Ref Range    Rejected Test Trop     Reason for Rejection see below    Troponin    Collection Time: 03/25/22 12:57 PM   Result Value Ref Range    Troponin, High Sensitivity <6 0 - 11 ng/L   EKG 12 Lead    Collection Time: 03/25/22  1:00 PM   Result Value Ref Range    Ventricular Rate 65 BPM    Atrial Rate 65 BPM    P-R Interval 138 ms    QRS Duration 96 ms    Q-T Interval 392 ms    QTc Calculation (Bazett) 407 ms    P Axis 69 degrees    R Axis 72 degrees    T Axis 65 degrees       Imaging/Diagnostics:  XR CHEST (2 VW)    Result Date: 3/25/2022  1. Air-fluid level seen within the right perihilar region which could represent a cavitary lesion. Dedicated CT of the thorax is recommended with IV contrast.     CT CHEST W CONTRAST    Result Date: 3/25/2022  Indeterminate 4.7 cm thin-walled cavitary or cystic focus with air-fluid level at the right hilar region without associated inflammatory findings adjacent or separate nodule or mass. Considerations for air-fluid level within a pneumatocele. Assessment and plan        #Syncope  -Brief episode of syncope.   No prodrome, no postictal, no seizure-like activity, no fecal or urinary incontinence, no chest pain or palpitations.  -Risk factor include: History of vasovagal  -At this time there is concern for cardiac etiology, concern for arrhythmia. -EKG on admission: Normal QTC and CO interval early repolarization as per ekg  -Troponin unremarkable on admission  -CT chest: Cardiac size within normal limits without pericardial effusion. PLAN  -OBTAIN TELE STRIP FROM SYNCOPAL EPISODE IN ED  -Obtain echo, TSH, U tox, orthostatic vitals and IVF []  -Close monitoring on telemetry  -Monitor for contrast induced nephropathy   -Replace potassium and magnesium if needed  -Cardiology consult      #Right lung cavitary lesion  -incidentally found while being evaluated for syncope. -Denies any resp complaints, on room air, denies IV drug use, travel hx  -CT on admission: Indeterminate 4.7 cm thin-walled cavitary or cystic focus with air-fluid level at the right hilar region.  -Pulmonary was consulted in the ED. They recommended ID consult[]      #Obesity  -BMI 31  -Counseled the patient on importance of weight loss. Provided with a pamphlet on the DASH diet. Would benefit from outpatient screening for obesity syndrome.        VTE ppx: none  Diet regular

## 2022-03-26 LAB
ALBUMIN SERPL-MCNC: 3.7 G/DL (ref 3.5–5.2)
ALP BLD-CCNC: 71 U/L (ref 40–129)
ALT SERPL-CCNC: 34 U/L (ref 0–40)
ANION GAP SERPL CALCULATED.3IONS-SCNC: 8 MMOL/L (ref 7–16)
AST SERPL-CCNC: 22 U/L (ref 0–39)
BILIRUB SERPL-MCNC: 0.3 MG/DL (ref 0–1.2)
BUN BLDV-MCNC: 10 MG/DL (ref 6–20)
C-REACTIVE PROTEIN: 0.3 MG/DL (ref 0–0.4)
CALCIUM SERPL-MCNC: 8.7 MG/DL (ref 8.6–10.2)
CHLORIDE BLD-SCNC: 105 MMOL/L (ref 98–107)
CO2: 25 MMOL/L (ref 22–29)
CREAT SERPL-MCNC: 1.1 MG/DL (ref 0.7–1.2)
EKG ATRIAL RATE: 65 BPM
EKG ATRIAL RATE: 80 BPM
EKG P AXIS: 69 DEGREES
EKG P AXIS: 73 DEGREES
EKG P-R INTERVAL: 136 MS
EKG P-R INTERVAL: 138 MS
EKG Q-T INTERVAL: 374 MS
EKG Q-T INTERVAL: 392 MS
EKG QRS DURATION: 96 MS
EKG QRS DURATION: 98 MS
EKG QTC CALCULATION (BAZETT): 407 MS
EKG QTC CALCULATION (BAZETT): 431 MS
EKG R AXIS: 72 DEGREES
EKG R AXIS: 82 DEGREES
EKG T AXIS: 65 DEGREES
EKG T AXIS: 65 DEGREES
EKG VENTRICULAR RATE: 65 BPM
EKG VENTRICULAR RATE: 80 BPM
GFR AFRICAN AMERICAN: >60
GFR NON-AFRICAN AMERICAN: >60 ML/MIN/1.73
GLUCOSE BLD-MCNC: 104 MG/DL (ref 74–99)
POTASSIUM REFLEX MAGNESIUM: 4 MMOL/L (ref 3.5–5)
SODIUM BLD-SCNC: 138 MMOL/L (ref 132–146)
TOTAL CK: 72 U/L (ref 20–200)
TOTAL PROTEIN: 6.3 G/DL (ref 6.4–8.3)
TROPONIN, HIGH SENSITIVITY: <6 NG/L (ref 0–11)

## 2022-03-26 PROCEDURE — 2580000003 HC RX 258: Performed by: INTERNAL MEDICINE

## 2022-03-26 PROCEDURE — 1200000000 HC SEMI PRIVATE

## 2022-03-26 PROCEDURE — 99254 IP/OBS CNSLTJ NEW/EST MOD 60: CPT | Performed by: INTERNAL MEDICINE

## 2022-03-26 PROCEDURE — 87040 BLOOD CULTURE FOR BACTERIA: CPT

## 2022-03-26 PROCEDURE — 87305 ASPERGILLUS AG IA: CPT

## 2022-03-26 PROCEDURE — 6360000002 HC RX W HCPCS: Performed by: INTERNAL MEDICINE

## 2022-03-26 PROCEDURE — 93308 TTE F-UP OR LMTD: CPT

## 2022-03-26 PROCEDURE — 99232 SBSQ HOSP IP/OBS MODERATE 35: CPT | Performed by: INTERNAL MEDICINE

## 2022-03-26 PROCEDURE — 80053 COMPREHEN METABOLIC PANEL: CPT

## 2022-03-26 PROCEDURE — 93010 ELECTROCARDIOGRAM REPORT: CPT | Performed by: INTERNAL MEDICINE

## 2022-03-26 PROCEDURE — 84484 ASSAY OF TROPONIN QUANT: CPT

## 2022-03-26 PROCEDURE — 87449 NOS EACH ORGANISM AG IA: CPT

## 2022-03-26 PROCEDURE — 86140 C-REACTIVE PROTEIN: CPT

## 2022-03-26 PROCEDURE — APPSS180 APP SPLIT SHARED TIME > 60 MINUTES: Performed by: NURSE PRACTITIONER

## 2022-03-26 PROCEDURE — 93005 ELECTROCARDIOGRAM TRACING: CPT | Performed by: INTERNAL MEDICINE

## 2022-03-26 PROCEDURE — 82550 ASSAY OF CK (CPK): CPT

## 2022-03-26 PROCEDURE — 36415 COLL VENOUS BLD VENIPUNCTURE: CPT

## 2022-03-26 RX ADMIN — AMPICILLIN SODIUM AND SULBACTAM SODIUM 3000 MG: 2; 1 INJECTION, POWDER, FOR SOLUTION INTRAMUSCULAR; INTRAVENOUS at 16:44

## 2022-03-26 RX ADMIN — AMPICILLIN SODIUM AND SULBACTAM SODIUM 3000 MG: 2; 1 INJECTION, POWDER, FOR SOLUTION INTRAMUSCULAR; INTRAVENOUS at 23:25

## 2022-03-26 RX ADMIN — AMPICILLIN SODIUM AND SULBACTAM SODIUM 3000 MG: 2; 1 INJECTION, POWDER, FOR SOLUTION INTRAMUSCULAR; INTRAVENOUS at 01:01

## 2022-03-26 RX ADMIN — AMPICILLIN SODIUM AND SULBACTAM SODIUM 3000 MG: 2; 1 INJECTION, POWDER, FOR SOLUTION INTRAMUSCULAR; INTRAVENOUS at 05:05

## 2022-03-26 RX ADMIN — AMPICILLIN SODIUM AND SULBACTAM SODIUM 3000 MG: 2; 1 INJECTION, POWDER, FOR SOLUTION INTRAMUSCULAR; INTRAVENOUS at 10:38

## 2022-03-26 RX ADMIN — SODIUM CHLORIDE, POTASSIUM CHLORIDE, SODIUM LACTATE AND CALCIUM CHLORIDE: 600; 310; 30; 20 INJECTION, SOLUTION INTRAVENOUS at 20:06

## 2022-03-26 ASSESSMENT — PAIN SCALES - GENERAL
PAINLEVEL_OUTOF10: 0

## 2022-03-26 NOTE — CONSULTS
[address] [place] [address] [facility]  Consultation Note     Admit Date: 3/25/2022 11:01 AM    Reason for Consult:   Asymptomatic incidentally found right hilar cavity lesion    Attending Physician:  Grace Blacno MD    HISTORY OF PRESENT ILLNESS:             The history is obtained from extensive review of available past medical records. The patient is a 32 y.o. male who is not known to the ID service. The patient was fully vaccinated against code but did not get a booster. He came down with SARS-CoV-2 in December 2021. He was quite sick at home but did not require hospitalization. About 4 weeks prior to the presentation he had a near syncopal episode when he got profusely diaphoretic and turned white. He did not have a fever. He denies having cough. Denies any poor dentition. He works as a teacher at [employer]. He was listening to audio about CPR and a real 911 phone call when he had a syncopal episode. He was sitting on the chair and just slouched over. Woke up and was seen by the  who advised him to come to the ED. He was sent back home and was brought back because they found an incidental cavity on the right side near the hilum with an air-fluid level. Unasyn was started. He denies ever coughing up any foul smelling or tasting sputum. Past Medical History:    History reviewed. No pertinent past medical history. Past Surgical History:    History reviewed. No pertinent surgical history.   Current Medications:   Scheduled Meds:   sodium chloride flush  5-40 mL IntraVENous 2 times per day    ampicillin-sulbactam  3,000 mg IntraVENous Q6H     Continuous Infusions:   sodium chloride      lactated ringers 150 mL/hr at 03/25/22 2125     PRN Meds:perflutren lipid microspheres, sodium chloride flush, sodium chloride, ondansetron **OR** ondansetron, polyethylene glycol, acetaminophen **OR** acetaminophen    Allergies:  Sulfa antibiotics    Social History: Social History     Socioeconomic History    Marital status: Single     Spouse name: None    Number of children: None    Years of education: None    Highest education level: None   Occupational History    None   Tobacco Use    Smoking status: Never Smoker    Smokeless tobacco: Never Used   Substance and Sexual Activity    Alcohol use: None    Drug use: None    Sexual activity: None   Other Topics Concern    None   Social History Narrative    None     Social Determinants of Health     Financial Resource Strain: Low Risk     Difficulty of Paying Living Expenses: Not hard at all   Food Insecurity: No Food Insecurity    Worried About Running Out of Food in the Last Year: Never true    [address] in the Last Year: Never true   Transportation Needs:     Lack of Transportation (Medical): Not on file    Lack of Transportation (Non-Medical): Not on file   Physical Activity:     Days of Exercise per Week: Not on file    Minutes of Exercise per Session: Not on file   Stress:     Feeling of Stress : Not on file   Social Connections:     Frequency of Communication with Friends and Family: Not on file    Frequency of Social Gatherings with Friends and Family: Not on file    Attends Spiritism Services: Not on file    Active Member of [address] [place] [address] or Organizations: Not on file    Attends Club or Organization Meetings: Not on file    Marital Status: Not on file   Intimate Partner Violence:     Fear of Current or Ex-Partner: Not on file    Emotionally Abused: Not on file    Physically Abused: Not on file    Sexually Abused: Not on file   Housing Stability:     Unable to Pay for Housing in the Last Year: Not on file    Number of [place] in the Last Year: Not on file    Unstable Housing in the Last Year: Not on file      Pets: Cat, dog and snake  Travel: No  The patient lives at home with his fisudhae.   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 No pertinent family history. . Otherwise non-pertinent to the chief complaint. REVIEW OF SYSTEMS:    Constitutional: Negative for fevers, chills, positive for diaphoresis  Neurologic: Negative   Psychiatric: Negative  Rheumatologic: Negative   Endocrine: Negative  Hematologic: Negative  Immunologic: **As in the HPI  ENT: Negative  Respiratory: Negative   Cardiovascular: Negative  GI: Negative  : Negative  Musculoskeletal: Negative  Skin: No rashes. PHYSICAL EXAM:    Vitals:   BP (!) 125/59   Pulse 58   Temp 97.7 °F (36.5 °C) (Axillary)   Resp 16   Ht 6' (1.829 m)   Wt 230 lb (104.3 kg)   SpO2 98%   BMI 31.19 kg/m²   Constitutional: The patient is awake, alert, and oriented. No distress  Skin: Warm and dry. No rashes were noted. HEENT: Eyes show round, and reactive pupils. No jaundice. Moist mucous membranes, no ulcerations, no thrush. Teeth are in good condition. Neck: Supple to movements. No lymphadenopathy. Chest: No use of accessory muscles to breathe. Symmetrical expansion. Auscultation reveals no wheezing, crackles, or rhonchi. Cardiovascular: S1 and S2 are rhythmic and regular. No murmurs appreciated. Abdomen: Positive bowel sounds to auscultation. Benign to palpation. No masses felt. No hepatosplenomegaly. Extremities: No clubbing, no cyanosis, no edema.   Lines: Peripheral.      CBC+dif:  Recent Labs     03/25/22  1200   WBC 6.6   HGB 16.1   HCT 46.7   MCV 83.7      NEUTROABS 4.55     No results found for: CRP   No results found for: CRP  Lab Results   Component Value Date    SEDRATE 5 03/25/2022     Lab Results   Component Value Date    ALT 34 03/26/2022    AST 22 03/26/2022    ALKPHOS 71 03/26/2022    BILITOT 0.3 03/26/2022     Lab Results   Component Value Date     03/26/2022    K 4.0 03/26/2022     03/26/2022    CO2 25 03/26/2022    BUN 10 03/26/2022    CREATININE 1.1 03/26/2022    GFRAA >60 03/26/2022    LABGLOM >60 03/26/2022 GLUCOSE 104 03/26/2022    PROT 6.3 03/26/2022    LABALBU 3.7 03/26/2022    CALCIUM 8.7 03/26/2022    BILITOT 0.3 03/26/2022    ALKPHOS 71 03/26/2022    AST 22 03/26/2022    ALT 34 03/26/2022       No results found for: PROTIME, INR    Lab Results   Component Value Date    TSH 0.916 03/25/2022       No results found for: NITRITE, COLORU, PHUR, LABCAST, WBCUA, RBCUA, MUCUS, TRICHOMONAS, YEAST, BACTERIA, CLARITYU, SPECGRAV, LEUKOCYTESUR, UROBILINOGEN, BILIRUBINUR, BLOODU, GLUCOSEU, AMORPHOUS    No results found for: HCO3, BE, O2SAT, PH, THGB, PCO2, PO2, TCO2  Radiology:      Microbiology:  Pending  No results for input(s): BC in the last 72 hours. No results for input(s): ORG in the last 72 hours. No results for input(s): Brea Rivas in the last 72 hours. No results for input(s): STREPNEUMAGU in the last 72 hours. No results for input(s): LP1UAG in the last 72 hours. No results for input(s): ASO in the last 72 hours. No results for input(s): CULTRESP in the last 72 hours. No results for input(s): PROCAL in the last 72 hours. Assessment:  · Status post SARS-CoV-2 infection in December 2021 and a fully vaccinated patient  · Cavitary lesion right hilum. Probable cavitary pneumonia    Plan:    · Continue Unasyn  · 1-3 beta glucan  · Galactomannan  · Check cultures, baseline ESR, CRP  · Bronchoscopy  · Will follow with you    Thank you for having us see this patient in consultation. Case discussed with Dr. Svetlana White.     Estefani Herrera MD  1:12 PM  3/26/2022

## 2022-03-26 NOTE — CONSULTS
Renetta Mahoney M.D.,[facility]  Wolfgang Meza D.O., F.ELEAZAR., Dasha Huntley M.D. Coy Mckeon M.D. Jhon Edmonds D.O. Patient:  Naveed Parker 32 y.o. male MRN: [id number]     Date of Service: 3/26/2022      PULMONARY CONSULTATION    Reason for Consultation: cavitary lung lesion  Referring Physician: Dr. Medina Russian with the referring physician will be sent via the electronic medical record. Chief Complaint: syncopal episode    CODE STATUS: full code    SUBJECTIVE:  HPI:  Naveed Parker is a 32 y.o. male with a past medical history of vasovagal syndrome not previously known to our service who presented to the ED after LOC at work where he was found slouched in a chair. He had a chest xray which incidentally showed a cavitary lesion, we were consulted for this. CT chest showed the lesion to be 4.7cm, thin walled and air filled at the level of the right hilum. He is no past medical history of childhood asthma or other lung conditions, but he does say that there were a few times where he was not able to take a full breath if the weather was humid. He has had a small nonproductive cough since he had Covid in 2021. He denies ever coughing up blood but does have night sweats periodically. He had an episode about 2 months ago when he was at work and became very diaphoretic and pale. He has no history of diabetes. He has a smoking history of approximately 1 year. He has never vape, and has smoked marijuana twice in his life. He denies fever, denies IV drug use, denies exposure to active tuberculosis, denies gardening denies farming, denies cough, denies travel. He does have a snake at home. Of note, his father also has a cyst in his lung, unknown cause, unknown location.     Past medical history: vasovagal syndrome x1 incident, depression    No pertinent surgical history: past knee surgery    Family history: paternal grandfather  of myocardial infarction    Paternal grandmother  of brain cancer    Social History:   Social History     Socioeconomic History    Marital status: Single     Spouse name: Not on file    Number of children: Not on file    Years of education: Not on file    Highest education level: Not on file   Occupational History    Not on file   Tobacco Use    Smoking status: Never Smoker    Smokeless tobacco: Never Used   Substance and Sexual Activity    Alcohol use: Not on file    Drug use: Not on file    Sexual activity: Not on file   Other Topics Concern    Not on file   Social History Narrative    Not on file     Social Determinants of Health     Financial Resource Strain: Low Risk     Difficulty of Paying Living Expenses: Not hard at all   Food Insecurity: No Food Insecurity    Worried About [address] in the Last Year: Never true    [address] in the Last Year: Never true   Transportation Needs:     Lack of Transportation (Medical): Not on file    Lack of Transportation (Non-Medical): Not on file   Physical Activity:     Days of Exercise per Week: Not on file    Minutes of Exercise per Session: Not on file   Stress:     Feeling of Stress : Not on file   Social Connections:     Frequency of Communication with Friends and Family: Not on file    Frequency of Social Gatherings with Friends and Family: Not on file    Attends Mu-ism Services: Not on file    Active Member of Clubs or Organizations: Not on file    Attends Club or Organization Meetings: Not on file    Marital Status: Not on file   Intimate Partner Violence:     Fear of Current or Ex-Partner: Not on file    Emotionally Abused: Not on file    Physically Abused: Not on file    Sexually Abused: Not on file   Housing Stability:     Unable to Pay for Housing in the Last Year: Not on file    Number of [place] in the Last Year: Not on file    Unstable Housing in the Last Year: Not on file     Smoking history: The patient is a past smoker of 1 year. Denies history of vaping.     ETOH: has no history on file for alcohol use. Exposures: There  is not history of TB or TB exposure. There is not asbestos or silica dust exposure. The patient reports does not have coal, foundry, quarry or [employer] exposure. Recent travel history none. There is not  history of recreational or IV drug use. There is not hot tub exposure. The patient does have a pet snake, 2 dogs and a cat. Vaccines:    Influenza:  Up-to-date  Pneumococcal Polysaccharide:  Not indicated  Covid vaccines are up-to-date  Immunization History   Administered Date(s) Administered    COVID-19, Pfizer Purple top, DILUTE for use, 12+ yrs, 30mcg/0.3mL dose 02/05/2021, 02/26/2021        Home Meds: Medications Prior to Admission: FLUoxetine (PROZAC) 20 MG capsule, Take 1 capsule by mouth daily    CURRENT MEDS :  Scheduled Meds:   sodium chloride flush  5-40 mL IntraVENous 2 times per day    ampicillin-sulbactam  3,000 mg IntraVENous Q6H       Continuous Infusions:   sodium chloride      lactated ringers 150 mL/hr at 03/25/22 2125       Allergies   Allergen Reactions    Sulfa Antibiotics Rash       REVIEW OF SYSTEMS:  Constitutional: Denies fever, weight loss, night sweats, and fatigue  Skin: Denies pigmentation, dark lesions, and rashes   HEENT: Denies hearing loss, tinnitus, ear drainage, epistaxis, sore throat, and hoarseness. Cardiovascular: Denies palpitations, chest pain, and chest pressure. Respiratory: Denies cough, dyspnea at rest, hemoptysis, apnea, and choking.   Gastrointestinal: Denies nausea, vomiting, poor appetite, diarrhea, heartburn or reflux  Genitourinary: Denies dysuria, frequency, urgency or hematuria  Musculoskeletal: Denies myalgias, muscle weakness, and bone pain  Neurological: Denies dizziness, vertigo, headache, and focal weakness  Psychological: Denies anxiety and depression  Endocrine: Denies heat intolerance and cold intolerance  Hematopoietic/Lymphatic: Denies bleeding problems and blood transfusions    OBJECTIVE:   BP (!) 125/59   Pulse 58   Temp 97.7 °F (36.5 °C) (Axillary)   Resp 16   Ht 6' (1.829 m)   Wt 230 lb (104.3 kg)   SpO2 98%   BMI 31.19 kg/m²   SpO2 Readings from Last 1 Encounters:   03/26/22 98%        I/O:    Intake/Output Summary (Last 24 hours) at 3/26/2022 0900  Last data filed at 3/26/2022 0607  Gross per 24 hour   Intake 2346.7 ml   Output --   Net 2346.7 ml     Vent Information  SpO2: 98 %    Physical Exam:  General: The patient is lying in bed comfortably without any distress. Breathing is not labored  HEENT: Pupils are equal round and reactive to light, there are no oral lesions and no post-nasal drip   Neck: supple without adenopathy  Cardiovascular: regular rate and rhythm without murmur or gallop  Respiratory: Clear to auscultation bilaterally without wheezing or crackles. Air entry is symmetric  Abdomen: soft, non-tender, non-distended, normal bowel sounds  Extremities: warm, no edema, no clubbing  Skin: no rash or lesion  Neurologic: CN II-XII grossly intact, no focal deficits    Pulmonary Function Testing personally reviewed and interpreted  None on file    Imaging personally reviewed:  3/25/2022 cxr  1. Air-fluid level seen within the right perihilar region which could   represent a cavitary lesion. Dedicated CT of the thorax is recommended with   IV contrast.     3/25/2022 CT chest  Indeterminate 4.7 cm thin-walled cavitary or cystic focus with air-fluid   level at the right hilar region without associated inflammatory findings   adjacent or separate nodule or mass. Considerations for air-fluid level   within a pneumatocele.      Echo:  None on file    Labs:  Lab Results   Component Value Date    WBC 6.6 03/25/2022    HGB 16.1 03/25/2022    HCT 46.7 03/25/2022    MCV 83.7 03/25/2022    MCH 28.9 03/25/2022    MCHC 34.5 03/25/2022    RDW 12.9 03/25/2022     03/25/2022    MPV 9.8 03/25/2022     Lab Results   Component Value Date     03/25/2022    K 4.9 03/25/2022     03/25/2022    CO2 21 03/25/2022    BUN 11 03/25/2022    CREATININE 1.1 03/25/2022    LABALBU 4.6 03/25/2022    CALCIUM 9.6 03/25/2022    GFRAA >60 03/25/2022    LABGLOM >60 03/25/2022     No results found for: PROTIME, INR  No results for input(s): PROBNP in the last 72 hours. No results for input(s): TROPONINI in the last 72 hours. No results for input(s): PROCAL in the last 72 hours. This SmartLink has not been configured with any valid records. Micro:  No results for input(s): CULTRESP in the last 72 hours. No results for input(s): LABGRAM in the last 72 hours. No results for input(s): LEGUR in the last 72 hours. No results for input(s): STREPNEUMAGU in the last 72 hours. No results for input(s): LP1UAG in the last 72 hours. Assessment:  Right lung cavitary lesion  Syncopal episode    Plan:  CT chest reviewed  Drug screen negative  Antibiotics  Per  ID    Cultures pending, ESR, CRP pending  He will need a bronchoscopy with BAL on Monday    Thank you for allowing me to participate in the care of [employer]. Please feel free to call with questions. This plan of care was reviewed in collaboration with Dr. Saima Gasca    Electronically signed by JOSÉ MANUEL Mcgarry CNP on 3/26/2022 at 9:00 AM      Note: This report was completed utilizing computer voice recognition software. Every effort has been made to ensure accuracy, however; inadvertent computerized transcription errors may be present      Seen and examined, agree with above. Meds, labs and imaging reviewed. He has an asymptomatic cavity in right hilar region. I question whether he has aspirated during one of his syncopal episodes. I doubt fungal, afb or granulomatous disease but favor holding abx and doing bronch with lavage on 3/28 by Dr. Parag Jimenez who I am covering for.

## 2022-03-26 NOTE — CONSULTS
Inpatient Cardiology Consultation      Reason for Consult:  Syncope and Early Repolarization on EKG    Consulting Physician: Dr. Heather Moyer    Requesting Physician:  Dr. Fidel Anguiano    Date of Consultation: 3/26/2022    HISTORY OF PRESENT ILLNESS:   Mr. Aditya Clark is a 80-year-old  male who is previously not known to [facility] in [facility]. His medical history as stated below. Mr. Aditya Clark presented to [facility] ED on 03/25/2022 with complaints of syncope. 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 He states the next thing he can recall was waking up after passing out. He denies loss of bowel or bladder function upon coming to. He also denies dizziness, lightheadedness, dyspnea prior to LOC. He states over the course of the past several months he has been having frequent episodes of LOC. Please note that he is somewhat of a poor historian and states that he usually has LOC episodes in the middle of the night. He states that recently his fiancée found him on the kitchen floor and \" I had no idea how that even happened, do not remember getting up\". He states approximately 1-2 months ago he was ambulating to get a snack in the middle of the night when he had another episode of LOC, he cannot recall the specifics regarding that episode as well. He states that he has been staying hydrated. Upon arrival to the ED his VS were oral temperature 98.2-84-/66-98% on RA. EKG SR with early repolarization (as interpreted by Dr. Heather Moyer). Lactic acid 1.9. WBC 6.6. H&H 16.1/46. 7. .  K4.9.  BUN/SCR 11/1.1. D-dimer less than 200. Troponin less than 6. CT of the chest with contrast revealed a 4.7 cm thin-walled cavitary or cystic focus with air-fluid. Possible pneumocele. He received 1 L NS bolus.   Of note, reportedly during his ED course he did have a syncopal episode lasting 10 seconds that was witnessed by an RN. It is uncertain as to what his telemetry monitoring showed at that time. He was admitted to a telemetry monitored unit. ID and Pulmonology were consulted. An echocardiogram was ordered. Cardiology was consulted for management of syncope and EKG changes noting early repolarization. Please note: past medical records were reviewed per electronic medical record (EMR) - see detailed reports under Past Medical/ Surgical History. Past Medical and Surgical History:    1. Obesity  2. Family Hx premature CAD  3. Reported treadmill stress test 01/2017: Normal with METS of 15 and achieved maximal predicted heart rate capacity  4. Reported TTE 01/2017 with EF 55-60%  5. Syncope in 01/2017 with reported EKG showing SB with HR 45 with J-point elevation in inferior and inferior lateral leads. Reason for syncope attributed to over exertion combined with dehydration. 6. Reported tilt table test in 01/2017 that was reportedly positive for orthostatic hypotension with NTG. 7. Depression  8. S/p arthroscopic left knee surgery      Medications Prior to admit:  Prior to Admission medications    Medication Sig Start Date End Date Taking?  Authorizing Provider   FLUoxetine (PROZAC) 20 MG capsule Take 1 capsule by mouth daily 12/6/21   Reena Quevedo, DO       Current Medications:    Current Facility-Administered Medications: perflutren lipid microspheres (DEFINITY) injection 1.65 mg, 1.5 mL, IntraVENous, ONCE PRN  sodium chloride flush 0.9 % injection 5-40 mL, 5-40 mL, IntraVENous, 2 times per day  sodium chloride flush 0.9 % injection 5-40 mL, 5-40 mL, IntraVENous, PRN  0.9 % sodium chloride infusion, 25 mL, IntraVENous, PRN  ondansetron (ZOFRAN-ODT) disintegrating tablet 4 mg, 4 mg, Oral, Q8H PRN **OR** ondansetron (ZOFRAN) injection 4 mg, 4 mg, IntraVENous, Q6H PRN  polyethylene glycol (GLYCOLAX) packet 17 g, 17 g, Oral, Daily PRN  acetaminophen (TYLENOL) tablet 650 mg, 650 mg, Oral, Q6H PRN **OR** acetaminophen (TYLENOL) suppository 650 mg, 650 mg, Rectal, Q6H PRN  lactated ringers infusion, , IntraVENous, Continuous  ampicillin-sulbactam (UNASYN) 3000 mg ivpb minibag, 3,000 mg, IntraVENous, Q6H    Allergies:  Sulfa antibiotics    Social History:    Smokes socially for 1 year in 2017. Next line states that he drinks 3 alcoholic beverages a week either wine or hard cider. Denies illicit drug use. Caffeine intake includes 64 ounces of coffee Monday through Friday and a small cup on the weekends. Family History:   States that his paternal grandfather had an MI at age 48    REVIEW OF SYSTEMS:     · Constitutional: Denies fevers, chills, night sweats, and fatigue  · HEENT: Denies headaches, nose bleeds, and blurred vision,oral pain, abscess or lesion. · Musculoskeletal: Denies falls, pain to BLE with ambulation and edema to BLE. · Neurological: Complains of dizziness and lightheadedness when he lays down. Denies numbness and tingling. Complains of frequent LOC-see HPI  · Cardiovascular: Complains of chest pain-see HPI. Denies palpitations, and feelings of heart racing. · Respiratory: Denies orthopnea and PND  · Gastrointestinal: Denies heartburn, nausea/vomiting, diarrhea and constipation, black/bloody, and tarry stools. · Genitourinary: Denies dysuria and hematuria  · Hematologic: Denies excessive bruising or bleeding  · Lymphatic: Denies lumps and bumps to neck, axilla, breast, and groin  · Endocrine: Denies excessive thirst. Denies intolerance to hot and cold  · Psychiatric: Denies anxiety and depression. PHYSICAL EXAM:   BP (!) 125/59   Pulse 58   Temp 97.7 °F (36.5 °C) (Axillary)   Resp 16   Ht 6' (1.829 m)   Wt 230 lb (104.3 kg)   SpO2 98%   BMI 31.19 kg/m²   CONST:  Well developed, obese, young adult  male who appears stated age.  Awake, alert, cooperative, no apparent distress  HEENT:   Head- Normocephalic, atraumatic   Eyes- Conjunctivae pink, anicteric  Throat- Oral mucosa pink and moist  Neck-  No stridor, trachea midline, no jugular venous distention. No adenopathy   CHEST: Chest symmetrical and non-tender to palpation. No accessory muscle use or intercostal retractions  RESPIRATORY: Lung sounds - clear throughout fields   CARDIOVASCULAR:     No carotid bruit  Heart Inspection- shows no noted pulsations  Heart Palpation- no heaves or thrills; PMI is non-displaced   Heart Ausculation- Regular rate and rhythm, no murmur. No s3, s4 or rub   PV: No lower extremity edema. No varicosities. Pedal pulses palpable, no clubbing or cyanosis   ABDOMEN: Soft, obese, non-tender to light palpation. Bowel sounds present. No palpable masses no organomegaly; no abdominal bruit  MS: Good muscle strength and tone. No atrophy or abnormal movements. : Deferred  SKIN: Warm and dry no statis dermatitis or ulcers   NEURO / PSYCH: Oriented to person, place and time. Speech clear and appropriate. Follows all commands. Pleasant affect     DATA:    ECG: As above  Tele strips: SB/SR with HR 41-64. Currently SB with HR 54  Diagnostic:      Intake/Output Summary (Last 24 hours) at 3/26/2022 1021  Last data filed at 3/26/2022 0784  Gross per 24 hour   Intake 2346.7 ml   Output --   Net 2346.7 ml       Labs:   CBC:   Recent Labs     03/25/22  1200   WBC 6.6   HGB 16.1   HCT 46.7        BMP:   Recent Labs     03/25/22  1200      K 4.9   CO2 21*   BUN 11   CREATININE 1.1   LABGLOM >60   CALCIUM 9.6   TSH:   Recent Labs     03/25/22  1257   TSH 0.916   LIVER PROFILE:  Recent Labs     03/25/22  1200   AST 33   ALT 46*   LABALBU 4.6     Results for Mike Smith (MRN [id number]) as of 3/26/2022 10:23   Ref. Range 3/25/2022 12:57   Troponin, High Sensitivity Latest Ref Range: 0 - 11 ng/L <6     03/25/2022 CXR:   1.  Air-fluid level seen within the right perihilar region which could represent a cavitary lesion.  Dedicated CT of the thorax is recommended with IV contrast.    03/25/2022 CT Chest with contrast:   Indeterminate 4.7 cm thin-walled cavitary or cystic focus with air-fluid level at the right hilar region without associated inflammatory findings adjacent or separate nodule or mass.  Considerations for air-fluid level within a pneumatocele. IMPRESSION and PLAN to follow per Dr. Cindy Qureshi  Case discussed with Dr. Cindy Qureshi. D-dimer<200. Recommend outpatient 14-day event monitor. Outpatient EP referral.  Consider coronary CTA for evaluation of possible anomalies. Will review echo. Electronically signed by JOSÉ MANUEL Richards CNP on 3/26/2022 at 10:21 AM       Addendum:  Jessica Shah MD     Reason for consult: Syncope, Abnormal EKG     Patient previously not known to [id number] [place] [address] Cardiology.      History of Present illness: 32year old with history of syncope, likely vasovagal per his family doctor note in 2019 presented to [facility] ED on 03/25/2022 with complaints of syncope. 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 He states the next thing he can recall was waking up after passing out. He denies loss of bowel or bladder function upon coming to. He also denies dizziness, lightheadedness, dyspnea prior to LOC. He states over the course of the past several months he has been having frequent episodes of LOC. Please note that he is somewhat of a poor historian and states that he usually has LOC episodes in the middle of the night. He states that recently his fiancée found him on the kitchen floor and \" I had no idea how that even happened, do not remember getting up\". He states approximately 1-2 months ago he was ambulating to get a snack in the middle of the night when he had another episode of LOC, he cannot recall the specifics regarding that episode as well.   He states that he has been staying hydrated. Upon arrival to the ED his VS were oral temperature 98.2-84-/66-98% on RA. EKG SR with early repolarization (as interpreted by Dr. Jennifer Wells). Lactic acid 1.9. WBC 6.6. H&H 16.1/46. 7. .  K4.9.  BUN/SCR 11/1.1. D-dimer less than 200. Troponin less than 6. CT of the chest with contrast revealed a 4.7 cm thin-walled cavitary or cystic focus with air-fluid. Possible pneumocele. He received 1 L NS bolus. Of note, reportedly during his ED course he did have a syncopal episode lasting 10 seconds that was witnessed by an RN. It is uncertain as to what his telemetry monitoring showed at that time. He was admitted to a telemetry monitored unit. ID and Pulmonology were consulted. An echocardiogram was ordered. Cardiology was consulted for management of syncope and EKG changes noting early repolarization. He had 2-3 syncope episodes last year. No heart racing or Cp or SOB prior to syncope.      Review of systems:  Review of 10 systems negative except as mentioned in the HPI     Medical History: Reviewed     Surgical history: Reviewed     FamilyHistory: Reviewed     Allergies:  Reviewed     Social Hx: Reviewed.     Medications: reviewed.     Labs/imaging studies: Reviewed.     Above ZA exam, assessment reviewed and reflect my work. I personally saw, examined, and evaluated the patient today. I spent derian than 50% of total consult time today.   I personally reviewed the medications, rhythm strips, pertinent labs and test reports. I directly participated in the medical-decision making, ordering pertinent tests and medication adjustment.     Physical exam:          Vitals:     03/26/22 0730   BP: (!) 125/59   Pulse: 58   Resp: 16   Temp: 97.7 °F (36.5 °C)   SpO2: 98%         In general, this is a well developed, well nourished who appears stated age.  awake, alert, cooperative, no apparent distress     HEENT: eyes -conjunctivae pink,   Neck-  no stridor, no carotid bruit. no jugular venous distention   RESPIRATORY: Chest symmetrical and non-tender to palpation. No accessory muscles use. Lung auscultation - few rhonchi  CARDIOVASCULAR:     Heart Palpation - no palpable thrills  Heart Ausculation - Regular rate and rhythm, 1/6 systolic murmur, No s3 or rub. No lower extremity edema, Distal pulses palpable, no cyanosis   ABDOMEN: Soft, nontender,  Bowel sounds present. MS: n/a. : Deferred  Rectal Exam: Deferred  SKIN: warm and dry  NEURO / PSYCH: oriented to person, place           Impression/Recommendations:     Abnormal EKG - Early Repolarization which was noted in 2019. Normal QTc interval. No signs of Brugada.   Echo pending      Sinus  bradycardia - TSH normal, Out Pt Event monitor      Syncope - hx of Orthostasis - Had w/u done in 2019 - Out Pt Event monitor and then EP referral; 2D Echo pending     Cavitary lesion in Right Lung - Per Pulmonary/ID consultants                    Abvoe recommendations d/w him and all questions answered.     Thank you for the consult.           Snehal Talley MD  3/26/2022  12:53 PM  [facility]) Cardiology

## 2022-03-26 NOTE — PROGRESS NOTES
Patient sustaining heart rate of 40. Patient was asleep and woke to voice. Asymptomatic sury.       Vitals:    03/26/22 0330   BP: 113/62   Pulse: (!) 46   Resp: 18   Temp: 97.8 °F (36.6 °C)   SpO2: 96%

## 2022-03-26 NOTE — PROGRESS NOTES
Internal Medicine Progress Note    3/26/2022      Name:   Helio Zhu  MRN:     [id number]     Acct:      [de-identified]   Room:   [address] [place] [address] Day:  1  Admit Date:  3/25/2022 11:01 AM    PCP:   Sissy Harrison. DO Ez  Code Status:  Full Code    Subjective:    HR in 40s overnight     -Patient denies any chest pain, palpitations, all any further syncopal episodes, lower extremity swelling, PND, orthopnea. Denies any cough, respiratory issues    -In regards to the pulmonary findings. Patient reports that his father also a lung cyst in the upper left lobe. It is been stable at 1.2 cm with central calcifications. He was told that it was from a prior fungal infection in the past.      -Patient reports that he did have 2 small cups of coffee before the syncopal event in class. But typically has a lot more caffeine. 5 years ago when he had a syncope episode after strenuous workout there is no preworkout or caffeine use at that time. Review of Systems:     Constitutional:  negative for chills, fevers, sweats  Respiratory:  negative for cough, dyspnea on exertion, shortness of breath, wheezing  Cardiovascular:  negative for chest pain, chest pressure/discomfort, lower extremity edema, palpitations  Gastrointestinal:  negative for abdominal pain, constipation, diarrhea, nausea, vomiting  Neurological:  negative for dizziness, headache    Medications: Allergies:     Allergies   Allergen Reactions    Sulfa Antibiotics Rash       Current Meds:   Scheduled Meds:    sodium chloride flush  5-40 mL IntraVENous 2 times per day    ampicillin-sulbactam  3,000 mg IntraVENous Q6H     Continuous Infusions:    sodium chloride      lactated ringers 150 mL/hr at 03/25/22 2125     PRN Meds: perflutren lipid microspheres, sodium chloride flush, sodium chloride, ondansetron **OR** ondansetron, polyethylene glycol, acetaminophen **OR** acetaminophen    Data:     Past Medical History:   has no past medical history on file.    Social History:   reports that he has never smoked. He has never used smokeless tobacco.     Family History: History reviewed. No pertinent family history. Vitals:  BP (!) 125/59   Pulse 58   Temp 97.7 °F (36.5 °C) (Axillary)   Resp 16   Ht 6' (1.829 m)   Wt 230 lb (104.3 kg)   SpO2 98%   BMI 31.19 kg/m²   Temp (24hrs), Av °F (36.7 °C), Min:97.7 °F (36.5 °C), Max:98.3 °F (36.8 °C)    No results for input(s): POCGLU in the last 72 hours. I/O (24Hr): Intake/Output Summary (Last 24 hours) at 3/26/2022 1041  Last data filed at 3/26/2022 8162  Gross per 24 hour   Intake 2346.7 ml   Output --   Net 2346.7 ml       Labs:  Hematology:  Recent Labs     22  1228   WBC 6.6  --    RBC 5.58  --    HGB 16.1  --    HCT 46.7  --    MCV 83.7  --    MCH 28.9  --    MCHC 34.5  --    RDW 12.9  --      --    MPV 9.8  --    SEDRATE 5  --    DDIMER  --  <200     Chemistry:  Recent Labs     22  1200 22  1230 22  1257     --   --    K 4.9  --   --      --   --    CO2 21*  --   --    GLUCOSE 118* 112  --    BUN 11  --   --    CREATININE 1.1  --   --    ANIONGAP 11  --   --    LABGLOM >60  --   --    GFRAA >60  --   --    CALCIUM 9.6  --   --    TROPHS  --   --  <6     Recent Labs     22  1200 22  1257   PROT 7.6  --    LABALBU 4.6  --    TSH  --  0.916   AST 33  --    ALT 46*  --    ALKPHOS 94  --    BILITOT 0.3  --      ABG:No results found for: POCPH, PHART, PH, POCPCO2, UOP2QPM, PCO2, POCPO2, PO2ART, PO2, POCHCO3, GDG4MPU, HCO3, NBEA, PBEA, BEART, BE, THGBART, THB, CIZ9JFR, ELMI8VXC, V6DWJRAM, O2SAT, FIO2  No results found for: SPECIAL  No results found for: CULTURE    Radiology:  XR CHEST (2 VW)    Result Date: 3/25/2022  1. Air-fluid level seen within the right perihilar region which could represent a cavitary lesion.   Dedicated CT of the thorax is recommended with IV contrast.     CT CHEST W CONTRAST    Result Date: 3/25/2022  Indeterminate 4.7 cm thin-walled cavitary or cystic focus with air-fluid level at the right hilar region without associated inflammatory findings adjacent or separate nodule or mass. Considerations for air-fluid level within a pneumatocele. Physical Examination:        General appearance:  alert, cooperative and no distress  Mental Status:  oriented to person, place and time and normal affect  Lungs:  clear to auscultation bilaterally, normal effort  Heart:  regular rate and rhythm, no murmur  Abdomen:  soft, nontender, nondistended, normal bowel sounds, no masses, hepatomegaly, splenomegaly  Extremities:  no edema, redness, tenderness in the calves  Skin:  no gross lesions, rashes, induration    Assessment:        #Syncope  -HR 49 overnight   -Brief episode of syncope.mild brief vague chest pain just prior to event. No prodrome, no postictal, no seizure-like activity, no fecal or urinary incontinence, Denies  palpitations.  -Risk factor include: History of vasovagal episode. No family hx.   -At this time there is concern for cardiac etiology, concern for arrhythmia. -EKG on admission: Normal QTC and MD interval early repolarization as per ekg  -Troponin unremarkable on admission  -CT chest: Cardiac size within normal limits without pericardial effusion.  -U tox negative, TSH wnml  -Denies smoking, occasional Etoh, no herbal OTC supplements     PLAN  -OBTAIN TELE STRIP FROM SYNCOPAL EPISODE IN ED--I do not see it in chart.   -Echo: pending but obtained  []  -Close monitoring on telemetry, may need EP study, had Holter monitor in past but cant remember what was told, possible heart block. May need holter monitor again-prior to DC.   -Cardiology consult pending   -Initiate D/C planing      #Right lung cavitary lesion  -incidentally found while being evaluated for syncope.    -Denies any resp complaints, on room air, denies IV drug use, travel hx  -CT on admission: Indeterminate 4.7 cm thin-walled cavitary or cystic focus with air-fluid level at the right hilar region.  -Pulmonary was consulted in the ED. They recommended ID consult[] I do not think he would benefit from Abx at this time. He is not showing any signs of ACTIVE infection? Would consider still evaluating etiology however.    -Currently on amoxacillin   -as per pulm and ID     #Obesity  -BMI 31  -Counseled the patient on importance of weight loss. Provided with a pamphlet on the DASH diet.   Would benefit from outpatient screening for obesity syndrome.         VTE ppx: none  Diet regular

## 2022-03-26 NOTE — PLAN OF CARE
Problem: Pain:  Goal: Pain level will decrease  Description: Pain level will decrease  Outcome: Ongoing  Goal: Control of acute pain  Description: Control of acute pain  Outcome: Ongoing  Goal: Control of chronic pain  Description: Control of chronic pain  Outcome: Ongoing     Problem: Cardiac:  Goal: Ability to maintain an adequate cardiac output will improve  Description: Ability to maintain an adequate cardiac output will improve  Outcome: Ongoing  Goal: Hemodynamic stability will improve  Description: Hemodynamic stability will improve  Outcome: Ongoing

## 2022-03-27 LAB
CHOLESTEROL, FASTING: 131 MG/DL (ref 0–199)
HDLC SERPL-MCNC: 31 MG/DL
LDL CHOLESTEROL CALCULATED: 71 MG/DL (ref 0–99)
TRIGLYCERIDE, FASTING: 144 MG/DL (ref 0–149)
VLDLC SERPL CALC-MCNC: 29 MG/DL

## 2022-03-27 PROCEDURE — 2580000003 HC RX 258: Performed by: SPECIALIST

## 2022-03-27 PROCEDURE — 36415 COLL VENOUS BLD VENIPUNCTURE: CPT

## 2022-03-27 PROCEDURE — 2580000003 HC RX 258: Performed by: INTERNAL MEDICINE

## 2022-03-27 PROCEDURE — 6360000002 HC RX W HCPCS: Performed by: SPECIALIST

## 2022-03-27 PROCEDURE — C1751 CATH, INF, PER/CENT/MIDLINE: HCPCS

## 2022-03-27 PROCEDURE — 2500000003 HC RX 250 WO HCPCS: Performed by: SPECIALIST

## 2022-03-27 PROCEDURE — 99232 SBSQ HOSP IP/OBS MODERATE 35: CPT | Performed by: INTERNAL MEDICINE

## 2022-03-27 PROCEDURE — 99233 SBSQ HOSP IP/OBS HIGH 50: CPT | Performed by: INTERNAL MEDICINE

## 2022-03-27 PROCEDURE — 36569 INSJ PICC 5 YR+ W/O IMAGING: CPT

## 2022-03-27 PROCEDURE — 76937 US GUIDE VASCULAR ACCESS: CPT

## 2022-03-27 PROCEDURE — 80061 LIPID PANEL: CPT

## 2022-03-27 PROCEDURE — 6360000002 HC RX W HCPCS: Performed by: INTERNAL MEDICINE

## 2022-03-27 PROCEDURE — 02HV33Z INSERTION OF INFUSION DEVICE INTO SUPERIOR VENA CAVA, PERCUTANEOUS APPROACH: ICD-10-PCS | Performed by: INTERNAL MEDICINE

## 2022-03-27 PROCEDURE — 1200000000 HC SEMI PRIVATE

## 2022-03-27 RX ORDER — SODIUM CHLORIDE 0.9 % (FLUSH) 0.9 %
5-40 SYRINGE (ML) INJECTION PRN
Status: DISCONTINUED | OUTPATIENT
Start: 2022-03-27 | End: 2022-03-28 | Stop reason: HOSPADM

## 2022-03-27 RX ORDER — SODIUM CHLORIDE 9 MG/ML
25 INJECTION, SOLUTION INTRAVENOUS PRN
Status: DISCONTINUED | OUTPATIENT
Start: 2022-03-27 | End: 2022-03-28 | Stop reason: HOSPADM

## 2022-03-27 RX ORDER — HEPARIN SODIUM (PORCINE) LOCK FLUSH IV SOLN 100 UNIT/ML 100 UNIT/ML
3 SOLUTION INTRAVENOUS PRN
Status: DISCONTINUED | OUTPATIENT
Start: 2022-03-27 | End: 2022-03-28 | Stop reason: HOSPADM

## 2022-03-27 RX ORDER — LIDOCAINE HYDROCHLORIDE 10 MG/ML
5 INJECTION, SOLUTION EPIDURAL; INFILTRATION; INTRACAUDAL; PERINEURAL ONCE
Status: COMPLETED | OUTPATIENT
Start: 2022-03-27 | End: 2022-03-27

## 2022-03-27 RX ORDER — HEPARIN SODIUM (PORCINE) LOCK FLUSH IV SOLN 100 UNIT/ML 100 UNIT/ML
3 SOLUTION INTRAVENOUS EVERY 12 HOURS SCHEDULED
Status: DISCONTINUED | OUTPATIENT
Start: 2022-03-27 | End: 2022-03-28 | Stop reason: HOSPADM

## 2022-03-27 RX ORDER — SODIUM CHLORIDE 0.9 % (FLUSH) 0.9 %
5-40 SYRINGE (ML) INJECTION EVERY 12 HOURS SCHEDULED
Status: DISCONTINUED | OUTPATIENT
Start: 2022-03-27 | End: 2022-03-28 | Stop reason: HOSPADM

## 2022-03-27 RX ADMIN — SODIUM CHLORIDE, PRESERVATIVE FREE 300 UNITS: 5 INJECTION INTRAVENOUS at 19:58

## 2022-03-27 RX ADMIN — SODIUM CHLORIDE, POTASSIUM CHLORIDE, SODIUM LACTATE AND CALCIUM CHLORIDE: 600; 310; 30; 20 INJECTION, SOLUTION INTRAVENOUS at 12:45

## 2022-03-27 RX ADMIN — SODIUM CHLORIDE, PRESERVATIVE FREE 10 ML: 5 INJECTION INTRAVENOUS at 19:57

## 2022-03-27 RX ADMIN — Medication 30 ML: at 16:55

## 2022-03-27 RX ADMIN — AMPICILLIN SODIUM AND SULBACTAM SODIUM 3000 MG: 2; 1 INJECTION, POWDER, FOR SOLUTION INTRAMUSCULAR; INTRAVENOUS at 10:56

## 2022-03-27 RX ADMIN — SODIUM CHLORIDE, POTASSIUM CHLORIDE, SODIUM LACTATE AND CALCIUM CHLORIDE: 600; 310; 30; 20 INJECTION, SOLUTION INTRAVENOUS at 15:03

## 2022-03-27 RX ADMIN — SODIUM CHLORIDE, POTASSIUM CHLORIDE, SODIUM LACTATE AND CALCIUM CHLORIDE: 600; 310; 30; 20 INJECTION, SOLUTION INTRAVENOUS at 03:25

## 2022-03-27 RX ADMIN — ERTAPENEM SODIUM 1000 MG: 1 INJECTION, POWDER, LYOPHILIZED, FOR SOLUTION INTRAMUSCULAR; INTRAVENOUS at 14:32

## 2022-03-27 RX ADMIN — LIDOCAINE HYDROCHLORIDE 1.5 ML: 10 SOLUTION INTRAVENOUS at 16:50

## 2022-03-27 RX ADMIN — AMPICILLIN SODIUM AND SULBACTAM SODIUM 3000 MG: 2; 1 INJECTION, POWDER, FOR SOLUTION INTRAMUSCULAR; INTRAVENOUS at 04:04

## 2022-03-27 ASSESSMENT — PAIN SCALES - GENERAL
PAINLEVEL_OUTOF10: 0
PAINLEVEL_OUTOF10: 0

## 2022-03-27 NOTE — PROGRESS NOTES
Internal Medicine Progress Note    3/27/2022      Name:   Jody Parnell  MRN:     [id number]     Acct:      [de-identified]   Room:   [facility]9293-F  IP Day:  2  Admit Date:  3/25/2022 11:01 AM    PCP:   Ashtyn Herron. DO Ez  Code Status:  Full Code    Subjective:   -No documented overnight events  -Patient remained afebrile, hemodynamically stable with appropriate heart rate, on room air  -Echo returned, unrevealing, plan for outpatient event monitor  -Pulmonology does not feel the patient is bronchoscopy, they are okay with follow-up CT scan in 4 to 6 weeks  -ID on board, hold on bronch, remains on abx  -Possible discharge today     Patient denies any new or worsening symptoms or baseline. Denies any chest pain, palpitations, shortness of breath, cough, fever, night sweats, leg pain or swelling. No bowel or bladder changes. Review of Systems:     Constitutional:  negative for chills, fevers, sweats  Respiratory:  negative for cough, dyspnea on exertion, shortness of breath, wheezing  Cardiovascular:  negative for chest pain, chest pressure/discomfort, lower extremity edema, palpitations  Gastrointestinal:  negative for abdominal pain, constipation, diarrhea, nausea, vomiting  Neurological:  negative for dizziness, headache    Medications: Allergies: Allergies   Allergen Reactions    Sulfa Antibiotics Rash       Current Meds:   Scheduled Meds:    sodium chloride flush  5-40 mL IntraVENous 2 times per day    ampicillin-sulbactam  3,000 mg IntraVENous Q6H     Continuous Infusions:    sodium chloride      lactated ringers 150 mL/hr at 03/27/22 0607     PRN Meds: perflutren lipid microspheres, sodium chloride flush, sodium chloride, ondansetron **OR** ondansetron, polyethylene glycol, acetaminophen **OR** acetaminophen    Data:     Past Medical History:   has no past medical history on file. Social History:   reports that he has never smoked.  He has never used smokeless tobacco.     Family History: History reviewed. No pertinent family history. Vitals:  /62   Pulse 50   Temp 98.7 °F (37.1 °C) (Oral)   Resp 18   Ht 6' (1.829 m)   Wt 225 lb 8.5 oz (102.3 kg)   SpO2 98%   BMI 30.59 kg/m²   Temp (24hrs), Av.2 °F (36.8 °C), Min:97.7 °F (36.5 °C), Max:98.7 °F (37.1 °C)    No results for input(s): POCGLU in the last 72 hours. I/O (24Hr): Intake/Output Summary (Last 24 hours) at 3/27/2022 0846  Last data filed at 3/27/2022 9717  Gross per 24 hour   Intake 2300 ml   Output --   Net 2300 ml       Labs:  Hematology:  Recent Labs     22  1200 22  1228 22  1415   WBC 6.6  --   --    RBC 5.58  --   --    HGB 16.1  --   --    HCT 46.7  --   --    MCV 83.7  --   --    MCH 28.9  --   --    MCHC 34.5  --   --    RDW 12.9  --   --      --   --    MPV 9.8  --   --    SEDRATE 5  --   --    CRP  --   --  0.3   DDIMER  --  <200  --      Chemistry:  Recent Labs     22  1200 22  1230 22  1257 22  1105     --   --  138   K 4.9  --   --  4.0     --   --  105   CO2 21*  --   --  25   GLUCOSE 118* 112  --  104*   BUN 11  --   --  10   CREATININE 1.1  --   --  1.1   ANIONGAP 11  --   --  8   LABGLOM >60  --   --  >60   GFRAA >60  --   --  >60   CALCIUM 9.6  --   --  8.7   TROPHS  --   --  <6 <6   CKTOTAL  --   --   --  72     Recent Labs     22  1200 22  1257 22  1105 22  0220   PROT 7.6  --  6.3*  --    LABALBU 4.6  --  3.7  --    TSH  --  0.916  --   --    AST 33  --  22  --    ALT 46*  --  34  --    ALKPHOS 94  --  71  --    BILITOT 0.3  --  0.3  --    HDL  --   --   --  31     ABG:No results found for: POCPH, PHART, PH, POCPCO2, FWL4TTI, PCO2, POCPO2, PO2ART, PO2, POCHCO3, CWX7MCR, HCO3, NBEA, PBEA, BEART, BE, THGBART, THB, AKN3BRU, YVAW4PNH, K7PHEYJY, O2SAT, FIO2  No results found for: SPECIAL  No results found for: CULTURE    Radiology:  XR CHEST (2 VW)    Result Date: 3/25/2022  1.  Air-fluid level seen within the right perihilar region which could represent a cavitary lesion. Dedicated CT of the thorax is recommended with IV contrast.     CT CHEST W CONTRAST    Result Date: 3/25/2022  Indeterminate 4.7 cm thin-walled cavitary or cystic focus with air-fluid level at the right hilar region without associated inflammatory findings adjacent or separate nodule or mass. Considerations for air-fluid level within a pneumatocele. Physical Examination:        General appearance:  alert, cooperative and no distress  Mental Status:  oriented to person, place and time and normal affect  Lungs:  clear to auscultation bilaterally, normal effort  Heart:  regular rate and rhythm, no murmur  Abdomen:  soft, nontender, nondistended, normal bowel sounds, no masses, hepatomegaly, splenomegaly  Extremities:  no edema, redness, tenderness in the calves  Skin:  no gross lesions, rashes, induration    Assessment:        #Syncope  -Brief episode of syncope.mild brief vague chest pain just prior to event.   No prodrome, no postictal, no seizure-like activity, no fecal or urinary incontinence, Denies  palpitations.  -Risk factor include: History of vasovagal episode. No family hx.   -At this time there is concern for cardiac etiology, concern for arrhythmia.   -EKG on admission: Normal QTC and WA interval early repolarization as per ekg  -Troponin unremarkable on admission  -Echo: unrevealing for any structural, valvular abnormalities. Normal EF  -CT chest: Cardiac size within normal limits without pericardial effusion.  -U tox negative, TSH wnml  -Denies smoking, occasional Etoh, no herbal OTC supplements   -  PLAN  -OBTAIN TELE STRIP FROM SYNCOPAL EPISODE IN ED--I do not see it in chart.    -Cardiology on board, plan to be set up with a outpatient event monitor.   Unsure if he will get that set up prior to discharge or at the follow-up appointment.[][]   As per cardiology         #Right lung cavitary lesion  -incidentally found while being evaluated for syncope. -Denies any resp complaints, on room air, denies IV drug use, travel hx  -CT on admission: Indeterminate 4.7 cm thin-walled cavitary or cystic focus with air-fluid level at the right hilar region.   PLAN  -Currently on amoxacillin , F/U on beta glucan, galactomannan and cultures []  -Repeat CT chest in 4 to 6 weeks         #Obesity  -BMI 31  -Counseled the patient on importance of weight loss.  Provided with a pamphlet on the DASH diet.  Would benefit from outpatient screening for obesity syndrome.         VTE ppx: none  Diet regular

## 2022-03-27 NOTE — PROGRESS NOTES
[address] [place] [address] [facility]  Progress Note    SUBJECTIVE:  Chief Complaint   Patient presents with    Loss of Consciousness     pt was said to be at work and while sitting at desk, was slouched over and \"out of it\". pt appeared to have palor present and was diaphoretic. pt states he feels fine at this time. history of vasovagal sydrome. Patient is tolerating medications. No reported adverse drug reactions. No nausea, vomiting, diarrhea. Review of systems:  As stated above in the chief complaint, otherwise negative. Medications:  Scheduled Meds:   sodium chloride flush  5-40 mL IntraVENous 2 times per day    ampicillin-sulbactam  3,000 mg IntraVENous Q6H     Continuous Infusions:   sodium chloride      lactated ringers 150 mL/hr at 22 0607     PRN Meds:perflutren lipid microspheres, sodium chloride flush, sodium chloride, ondansetron **OR** ondansetron, polyethylene glycol, acetaminophen **OR** acetaminophen    OBJECTIVE:  /62   Pulse 50   Temp 98.7 °F (37.1 °C) (Oral)   Resp 18   Ht 6' (1.829 m)   Wt 225 lb 8.5 oz (102.3 kg)   SpO2 98%   BMI 30.59 kg/m²   Temp  Av.2 °F (36.8 °C)  Min: 97.7 °F (36.5 °C)  Max: 98.7 °F (37.1 °C)  Constitutional: The patient is awake, alert, and oriented. Skin: Warm and dry. No rashes were noted. HEENT: Round and reactive pupils. Moist mucous membranes. No ulcerations or thrush. Neck: Supple to movements. Chest: No use of accessory muscles to breathe. Symmetrical expansion. No wheezing, crackles or rhonchi. Cardiovascular: S1 and S2 are rhythmic and regular. No murmurs appreciated. Abdomen: Positive bowel sounds to auscultation. Benign to palpation. No masses felt. No hepatosplenomegaly. Extremities: No clubbing, no cyanosis, no edema.   Lines: peripheral    Laboratory and Tests Review:  Lab Results   Component Value Date    WBC 6.6 2022    HGB 16.1 2022    HCT 46.7 2022    MCV 83.7 03/25/2022     03/25/2022     Lab Results   Component Value Date    NEUTROABS 4.55 03/25/2022     No results found for: CRPHS  Lab Results   Component Value Date    ALT 34 03/26/2022    AST 22 03/26/2022    ALKPHOS 71 03/26/2022    BILITOT 0.3 03/26/2022     Lab Results   Component Value Date     03/26/2022    K 4.0 03/26/2022     03/26/2022    CO2 25 03/26/2022    BUN 10 03/26/2022    CREATININE 1.1 03/26/2022    CREATININE 1.1 03/25/2022    GFRAA >60 03/26/2022    LABGLOM >60 03/26/2022    GLUCOSE 104 03/26/2022    PROT 6.3 03/26/2022    LABALBU 3.7 03/26/2022    CALCIUM 8.7 03/26/2022    BILITOT 0.3 03/26/2022    ALKPHOS 71 03/26/2022    AST 22 03/26/2022    ALT 34 03/26/2022     Lab Results   Component Value Date    CRP 0.3 03/26/2022     Lab Results   Component Value Date    SEDRATE 5 03/25/2022     Radiology:  CT chest 3/25/2022  Impression   Indeterminate 4.7 cm thin-walled cavitary or cystic focus with air-fluid   level at the right hilar region without associated inflammatory findings   adjacent or separate nodule or mass.  Considerations for air-fluid level   within a pneumatocele. Microbiology:   Lab Results   Component Value Date    BC 24 Hours no growth 03/26/2022     Lab Results   Component Value Date    BLOODCULT2 24 Hours no growth 03/26/2022     No results found for: WNDABS  No results found for: RESPSMEAR  No results found for: MPNEUMO, CLAMYDCU, LABLEGI, AFBCX, FUNGSM, LABFUNG  No results found for: CULTRESP  No results found for: CXCATHTIP  No results found for: BFCS  No results found for: CXSURG  No results found for: LABURIN  No results found for: [facility]  No results for input(s): PROCAL in the last 72 hours. Galactomannan  Sputum  Blood culture  Urine drug screen negative  1, 3 beta D glucan    Assessment:  · Status post SARS-CoV-2 infection in December 2021 and a fully vaccinated patient  · Cavitary lesion right hilum.   Probable cavitary pneumonia  · Recurrent syncopal episode     Plan:    · Continue Unasyn   · 1-3 beta glucan  · Galactomannan  · Check cultures  · Bronchoscopy per pulmonary when ready. Reported to me they will defer and repeat imaging as an outpatient  · Will follow with you    Tosin Capone, APRN - CNS  10:15 AM  3/27/2022     Patient seen and examined. I had a face to face encounter with the patient. Agree with exam.  Assessment and plan as outlined above and directed by me. Addition and corrections were done as deemed appropriate. My exam and plan include: The patient is tolerating antibiotics. Change Unasyn over to Ertapenem for a minimum of 3 weeks. Reconciled. PICC for IV antibiotics. The patient can be discharged from ID standpoint. Instructed to call the office and make a follow-up appointment. Informed Consent for PICC:  I explained the risks, benefits, alternative options, and potential complications associated with insertion of Peripherally Inserted Central Catheter (PICC) with the patient prior to the procedure.     Electronically signed by Francesca Alfonso MD on 3/27/2022 at 1:28 PM     Francesca Alfonso MD  3/27/2022  1:27 PM

## 2022-03-27 NOTE — PROGRESS NOTES
Damaris Garland M.D.,[facility]  Kim Jeff D.O., F.A.C.O.I., Frank Schwartz M.D. Jules Carmona M.D. Rachel Shin D.O. Daily Pulmonary Progress Note    Patient:  Jessica Spine 32 y.o. male MRN: [id number]     Date of Service: 3/27/2022      Synopsis     We are following patient for cavitary lung lesion    \"CC\" syncopal episode    Code status: Full code      Subjective      Patient was seen and examined sitting in bed in no apparent distress. He is currently on room air and has no pulmonary complaints. Patient will continue antibiotics per ID and we will repeat CT scan in 4 to 6 weeks. No plans for bronchoscopy at this time per Dr. Cosme Mariano. Review of Systems:  Constitutional: Denies fever, weight loss, night sweats, and fatigue  Skin: Denies pigmentation, dark lesions, and rashes   HEENT: Denies hearing loss, tinnitus, ear drainage, epistaxis, sore throat, and hoarseness. Cardiovascular: Denies palpitations, chest pain, and chest pressure. Respiratory: Denies cough, dyspnea at rest, hemoptysis, apnea, and choking.   Gastrointestinal: Denies nausea, vomiting, poor appetite, diarrhea, heartburn or reflux  Genitourinary: Denies dysuria, frequency, urgency or hematuria  Musculoskeletal: Denies myalgias, muscle weakness, and bone pain  Neurological: Denies dizziness, vertigo, headache, and focal weakness  Psychological: Denies anxiety and depression  Endocrine: Denies heat intolerance and cold intolerance  Hematopoietic/Lymphatic: Denies bleeding problems and blood transfusions    24-hour events:  None    Objective   Vitals: /62   Pulse 50   Temp 98.7 °F (37.1 °C) (Oral)   Resp 18   Ht 6' (1.829 m)   Wt 225 lb 8.5 oz (102.3 kg)   SpO2 98%   BMI 30.59 kg/m²     I/O:    Intake/Output Summary (Last 24 hours) at 3/27/2022 0920  Last data filed at 3/27/2022 1413  Gross per 24 hour   Intake 2300 ml   Output --   Net 2300 ml       Vent Information  SpO2: 98 %      CURRENT MEDS :  Scheduled Meds:   sodium chloride flush  5-40 mL IntraVENous 2 times per day    ampicillin-sulbactam  3,000 mg IntraVENous Q6H       Physical Exam:  General Appearance: appears comfortable in no acute distress. HEENT: Normocephalic atraumatic without obvious abnormality   Neck: Lips, mucosa, and tongue normal.  Supple, symmetrical, trachea midline, no adenopathy;thyroid:  no enlargement/tenderness/nodules or JVD. Lung: Breath sounds CTA. Respirations   unlabored. Symmetrical expansion. Heart: RRR, normal S1, S2. No MRG  Abdomen: Soft, NT, ND. BS present x 4 quadrants. No bruit or organomegaly. Extremities: Pedal pulses 2+ symmetric b/l. Extremities normal, no cyanosis, clubbing, or edema. Musculokeletal: No joint swelling, no muscle tenderness. ROM normal in all joints of extremities. Neurologic: Mental status: Alert and Oriented X3 . Pertinent/ New Labs and Imaging Studies     Imaging Personally Reviewed:  3/25/2022 cxr  1. Air-fluid level seen within the right perihilar region which could   represent a cavitary lesion.  Dedicated CT of the thorax is recommended with   IV contrast.      3/25/2022 CT chest  Indeterminate 4.7 cm thin-walled cavitary or cystic focus with air-fluid   level at the right hilar region without associated inflammatory findings   adjacent or separate nodule or mass.  Considerations for air-fluid level   within a pneumatocele.      Echo:  3/26/2022  Normal left ventricular chamber size. Normal left ventricular systolic function, LVEF 91%. No LVOT obstruction. Normal diastolic function. Left atrium is of normal size. Interatrial septum not well visualized but appears intact. Normal right ventricle size and function, TAPSE 2.6cm. There is trace mitral regurgitation. No mitral valve prolapse. No hemodynamically significant aortic stenosis. There is trace tricuspid regurgitation, RVSP 23mmHg. Normal aortic root size.    No evidence of pericardial effusion. No intra cardiac mass or thrombus. No comparison study available. Labs:  Lab Results   Component Value Date    WBC 6.6 03/25/2022    HGB 16.1 03/25/2022    HCT 46.7 03/25/2022    MCV 83.7 03/25/2022    MCH 28.9 03/25/2022    MCHC 34.5 03/25/2022    RDW 12.9 03/25/2022     03/25/2022    MPV 9.8 03/25/2022     Lab Results   Component Value Date     03/26/2022    K 4.0 03/26/2022     03/26/2022    CO2 25 03/26/2022    BUN 10 03/26/2022    CREATININE 1.1 03/26/2022    LABALBU 3.7 03/26/2022    CALCIUM 8.7 03/26/2022    GFRAA >60 03/26/2022    LABGLOM >60 03/26/2022     No results found for: PROTIME, INR  No results for input(s): PROBNP in the last 72 hours. No results for input(s): PROCAL in the last 72 hours. This SmartLink has not been configured with any valid records. Micro:  No results for input(s): CULTRESP in the last 72 hours. No results for input(s): LABGRAM in the last 72 hours. No results for input(s): LEGUR in the last 72 hours. No results for input(s): STREPNEUMAGU in the last 72 hours. No results for input(s): LP1UAG in the last 72 hours. Assessment:    1. Right lung cavitary lesion  2. Syncopal episode    Plan:   1. CT chest reviewed  2. Drug screen negative  3. Antibiotics  Per  ID    4. Cultures pending, negative so far, ESR 5, CRP 0.3  5. Cardiology following for syncopal episode  6. No bronchoscopy planned per Dr. Shari Antonio at this time, home on antibiotics per ID, will follow with repeat CT chest in 4 to 6 weeks.   7. Okay to discharge per pulmonology    Electronically signed by JOSÉ MANUEL Morel CNP on 3/27/2022 at 9:20 AM

## 2022-03-27 NOTE — PROCEDURES
PICC   Catheter insertion date: 3/27/2022     Product Number:  [id number]   Lot No: [id number]   Gauge: 17   Lumen: SINGLE/ 4.5 Italian   R Basilic    Vein Diameter: 0.43CM   Mid upper arm circumference: 34CM   Catheter Length: 47CM   Internal Length: 43CM   External Catheter Length: 4CM   Ultrasound Used: YES  VPS [facility] [place] confirms PICC tip is placed in the lower 1/3 of the SVC or at the Cavoatrial junction. Floor nurse notified PICC is okay to use.    : Fred Carney RN

## 2022-03-27 NOTE — PROGRESS NOTES
[facility]) Physicians        CARDIOLOGY                 INPATIENT PROGRESS NOTE          PATIENT SEEN IN FOLLOW UP FOR:  Abnormal EKG, syncope    Hospital Day: 3     Alfredo Mireles is a 32year old patient previously not known to [facility] cardiology. SUBJECTIVE: Denies any CP, SOB or dizzy; No palpitations    ROS: Review of rest of 10 systems negative except as mentioned above    OBJECTIVE: No acute distress. See Assessment     Diagnostics:       Telemetry:  Reviewed    Echo 3/26/22   Summary   Normal left ventricular chamber size. Normal left ventricular systolic function, LVEF 23%. No LVOT obstruction. Normal diastolic function. Left atrium is of normal size. Interatrial septum not well visualized but appears intact. Normal right ventricle size and function, TAPSE 2.6cm. There is trace mitral regurgitation. No mitral valve prolapse. No hemodynamically significant aortic stenosis. There is trace tricuspid regurgitation, RVSP 23mmHg. Normal aortic root size. No evidence of pericardial effusion. No intra cardiac mass or thrombus. No comparison study available. Intake/Output Summary (Last 24 hours) at 3/27/2022 0955  Last data filed at 3/27/2022 2095  Gross per 24 hour   Intake 2300 ml   Output --   Net 2300 ml       Labs:   CBC:   Recent Labs     03/25/22  1200   WBC 6.6   HGB 16.1   HCT 46.7        BMP:   Recent Labs     03/25/22  1200 03/26/22  1105    138   K 4.9 4.0   CO2 21* 25   BUN 11 10   CREATININE 1.1 1.1   LABGLOM >60 >60   CALCIUM 9.6 8.7     Mag: No results for input(s): MG in the last 72 hours. Phos: No results for input(s): PHOS in the last 72 hours. TSH:   Recent Labs     03/25/22  1257   TSH 0.916     HgA1c:     BNP: No results for input(s): BNP in the last 72 hours. PT/INR: No results for input(s): PROTIME, INR in the last 72 hours. APTT:No results for input(s): APTT in the last 72 hours.   CARDIAC ENZYMES:  Recent Labs 22  1105   CKTOTAL 72     FASTING LIPID PANEL:  Lab Results   Component Value Date    HDL 31 2022    LDLCALC 71 2022     LIVER PROFILE:  Recent Labs     22  1200 22  1105   AST 33 22   ALT 46* 34   LABALBU 4.6 3.7       Current Inpatient Medications:   sodium chloride flush  5-40 mL IntraVENous 2 times per day    ampicillin-sulbactam  3,000 mg IntraVENous Q6H       IV Infusions (if any):   sodium chloride      lactated ringers 150 mL/hr at 22 0607         PHYSICAL EXAM:     CONSTITUTIONAL:   /62   Pulse 50   Temp 98.7 °F (37.1 °C) (Oral)   Resp 18   Ht 6' (1.829 m)   Wt 225 lb 8.5 oz (102.3 kg)   SpO2 98%   BMI 30.59 kg/m²   Pulse  Av.5  Min: 50  Max: 61  Systolic (26XYU), NQJ:117 , Min:111 , QOS:586    Diastolic (76ETC), NLP:74, Min:59, Max:62        In general, this is a well developed, well nourished who appears stated age. awake, alert, cooperative, no apparent distress     HEENT: eyes -conjunctivae pink,   Neck-  no stridor, no carotid bruit. no jugular venous distention   RESPIRATORY: Chest symmetrical and non-tender to palpation.  No accessory muscles use.   Lung auscultation - few rhonchi  CARDIOVASCULAR:     Heart Palpation - no palpable thrills  Heart Ausculation - Regular rate and rhythm, 1/6 systolic murmur, No s3 or rub. No lower extremity edema, Distal pulses palpable, no cyanosis   ABDOMEN: Soft, nontender,  Bowel sounds present. MS: n/a. : Deferred  Rectal Exam: Deferred  SKIN: warm and dry  NEURO / PSYCH: oriented to person, place           Impression/Recommendations:     Abnormal EKG - Early Repolarization which was present in 2019. Normal QTc interval. No signs of Brugada.   Echo OK      Sinus  bradycardia - Asymptomatic, TSH normal, No significant sury or tachyarrhythmias, Out Pt 14 day Event monitor      Syncope - hx of Orthostasis - Had w/u done in 2019 - Out Pt 14 day Event monitor and then [facility] EP referral     Cavitary lesion in Right Lung - Per Pulmonary/ID consultants    Hx of COVID 19 - Dec 2021              D/w Dr Andrea Alexandre  Cardiology will sign off. Out Pt 14 day Event monitor, and then f/u and [facility] EP referral    Echo findings and above recommendations d/w him and all questions answered.       Electronically signed by Nico Young MD on 3/27/2022 at 9:55 AM  [facility]) Cardiology

## 2022-03-28 ENCOUNTER — TELEPHONE (OUTPATIENT)
Dept: CARDIOLOGY CLINIC | Age: 27
End: 2022-03-28

## 2022-03-28 VITALS
RESPIRATION RATE: 18 BRPM | SYSTOLIC BLOOD PRESSURE: 114 MMHG | TEMPERATURE: 97.8 F | HEIGHT: 72 IN | DIASTOLIC BLOOD PRESSURE: 64 MMHG | BODY MASS INDEX: 31.15 KG/M2 | HEART RATE: 56 BPM | OXYGEN SATURATION: 97 % | WEIGHT: 230 LBS

## 2022-03-28 DIAGNOSIS — R00.1 SINUS BRADYCARDIA: ICD-10-CM

## 2022-03-28 DIAGNOSIS — R55 SYNCOPE, UNSPECIFIED SYNCOPE TYPE: Primary | ICD-10-CM

## 2022-03-28 LAB
EKG ATRIAL RATE: 55 BPM
EKG P AXIS: 74 DEGREES
EKG P-R INTERVAL: 136 MS
EKG Q-T INTERVAL: 404 MS
EKG QRS DURATION: 88 MS
EKG QTC CALCULATION (BAZETT): 386 MS
EKG R AXIS: 64 DEGREES
EKG T AXIS: 49 DEGREES
EKG VENTRICULAR RATE: 55 BPM

## 2022-03-28 PROCEDURE — A4216 STERILE WATER/SALINE, 10 ML: HCPCS | Performed by: SPECIALIST

## 2022-03-28 PROCEDURE — 93010 ELECTROCARDIOGRAM REPORT: CPT | Performed by: INTERNAL MEDICINE

## 2022-03-28 PROCEDURE — 2580000003 HC RX 258: Performed by: SPECIALIST

## 2022-03-28 PROCEDURE — 6360000002 HC RX W HCPCS: Performed by: SPECIALIST

## 2022-03-28 PROCEDURE — 99238 HOSP IP/OBS DSCHRG MGMT 30/<: CPT | Performed by: INTERNAL MEDICINE

## 2022-03-28 RX ADMIN — SODIUM CHLORIDE, PRESERVATIVE FREE 10 ML: 5 INJECTION INTRAVENOUS at 08:10

## 2022-03-28 RX ADMIN — SODIUM CHLORIDE, PRESERVATIVE FREE 300 UNITS: 5 INJECTION INTRAVENOUS at 08:09

## 2022-03-28 RX ADMIN — ERTAPENEM SODIUM 1000 MG: 1 INJECTION, POWDER, LYOPHILIZED, FOR SOLUTION INTRAMUSCULAR; INTRAVENOUS at 14:53

## 2022-03-28 ASSESSMENT — PAIN SCALES - GENERAL: PAINLEVEL_OUTOF10: 0

## 2022-03-28 NOTE — TELEPHONE ENCOUNTER
----- Message from JOSÉ MANUEL Day CNP sent at 3/26/2022  1:32 PM EDT -----  Regardin-day event monitor and follow-up  Patient needs 14-day event monitor upon discharge for syncope and bradycardia. He is new to us this admission. Will follow with Dr. Sis Merrill.     JESSE for pt to call back to schedule above

## 2022-03-28 NOTE — PROGRESS NOTES
Discharge papers given to patient, along with instructions. Tele and IV removed. Patient discharging with PICC and home health care for medication needs.  Wife to  patient after work at 5 pm.

## 2022-03-28 NOTE — PROGRESS NOTES
Franco Cantu M.D.,[facility]  Fly Laughlin D.O., F.A.C.OELISEO., Claire Atwood M.D. Maria C Garcia M.D. Jessica Byrnes D.O. Daily Pulmonary Progress Note    Patient:  Abigail Don 32 y.o. male MRN: [id number]     Date of Service: 3/28/2022      Synopsis     We are following patient for cavitary lung lesion    \"CC\" syncopal episode    Code status: Full code      Subjective      Patient was seen and examined sitting in bed in no apparent distress. He is currently on room air and has no pulmonary complaints. Patient will continue antibiotics per ID and we will repeat CT scan in 4 to 6 weeks. No plans for bronchoscopy at this time per Dr. Kip Cota. Review of Systems:  Constitutional: Denies fever, weight loss, night sweats, and fatigue  Skin: Denies pigmentation, dark lesions, and rashes   HEENT: Denies hearing loss, tinnitus, ear drainage, epistaxis, sore throat, and hoarseness. Cardiovascular: Denies palpitations, chest pain, and chest pressure. Respiratory: Denies cough, dyspnea at rest, hemoptysis, apnea, and choking.   Gastrointestinal: Denies nausea, vomiting, poor appetite, diarrhea, heartburn or reflux  Genitourinary: Denies dysuria, frequency, urgency or hematuria  Musculoskeletal: Denies myalgias, muscle weakness, and bone pain  Neurological: Denies dizziness, vertigo, headache, and focal weakness  Psychological: Denies anxiety and depression  Endocrine: Denies heat intolerance and cold intolerance  Hematopoietic/Lymphatic: Denies bleeding problems and blood transfusions    24-hour events:  None    Objective   Vitals: /64   Pulse 56   Temp 97.8 °F (36.6 °C) (Oral)   Resp 18   Ht 6' (1.829 m)   Wt 230 lb (104.3 kg)   SpO2 97%   BMI 31.19 kg/m²     I/O:  No intake or output data in the 24 hours ending 03/28/22 1449    Vent Information  SpO2: 97 %      CURRENT MEDS :  Scheduled Meds:   sodium chloride flush  5-40 mL IntraVENous 2 times per day    heparin flush  3 mL IntraVENous 2 times per day    ertapenem (INVanz) IVPB  1,000 mg IntraVENous Q24H       Physical Exam:  General Appearance: appears comfortable in no acute distress. HEENT: Normocephalic atraumatic without obvious abnormality   Neck: Lips, mucosa, and tongue normal.  Supple, symmetrical, trachea midline, no adenopathy;thyroid:  no enlargement/tenderness/nodules or JVD. Lung: Breath sounds CTA. Respirations   unlabored. Symmetrical expansion. Heart: RRR, normal S1, S2. No MRG  Abdomen: Soft, NT, ND. BS present x 4 quadrants. No bruit or organomegaly. Extremities: Pedal pulses 2+ symmetric b/l. Extremities normal, no cyanosis, clubbing, or edema. Musculokeletal: No joint swelling, no muscle tenderness. ROM normal in all joints of extremities. Neurologic: Mental status: Alert and Oriented X3 . Pertinent/ New Labs and Imaging Studies     Imaging Personally Reviewed:  3/25/2022 cxr  1. Air-fluid level seen within the right perihilar region which could   represent a cavitary lesion.  Dedicated CT of the thorax is recommended with   IV contrast.      3/25/2022 CT chest  Indeterminate 4.7 cm thin-walled cavitary or cystic focus with air-fluid   level at the right hilar region without associated inflammatory findings   adjacent or separate nodule or mass.  Considerations for air-fluid level   within a pneumatocele.      Echo:  3/26/2022  Normal left ventricular chamber size. Normal left ventricular systolic function, LVEF 65%. No LVOT obstruction. Normal diastolic function. Left atrium is of normal size. Interatrial septum not well visualized but appears intact. Normal right ventricle size and function, TAPSE 2.6cm. There is trace mitral regurgitation. No mitral valve prolapse. No hemodynamically significant aortic stenosis. There is trace tricuspid regurgitation, RVSP 23mmHg. Normal aortic root size. No evidence of pericardial effusion. No intra cardiac mass or thrombus.    No comparison study available. Labs:  Lab Results   Component Value Date    WBC 6.6 03/25/2022    HGB 16.1 03/25/2022    HCT 46.7 03/25/2022    MCV 83.7 03/25/2022    MCH 28.9 03/25/2022    MCHC 34.5 03/25/2022    RDW 12.9 03/25/2022     03/25/2022    MPV 9.8 03/25/2022     Lab Results   Component Value Date     03/26/2022    K 4.0 03/26/2022     03/26/2022    CO2 25 03/26/2022    BUN 10 03/26/2022    CREATININE 1.1 03/26/2022    LABALBU 3.7 03/26/2022    CALCIUM 8.7 03/26/2022    GFRAA >60 03/26/2022    LABGLOM >60 03/26/2022     No results found for: PROTIME, INR  No results for input(s): PROBNP in the last 72 hours. No results for input(s): PROCAL in the last 72 hours. This SmartLink has not been configured with any valid records. Micro:  No results for input(s): CULTRESP in the last 72 hours. No results for input(s): LABGRAM in the last 72 hours. No results for input(s): LEGUR in the last 72 hours. No results for input(s): STREPNEUMAGU in the last 72 hours. No results for input(s): LP1UAG in the last 72 hours. Assessment:    1. Superimposed bacterial pneumonia post COVID-19 infection   2. right lung pneumatocele-right hilum  3. Syncopal episode  4. Prior COVID-19 infection December 2021-fully vaccinated    Plan:   1. CT chest reviewed-pneumatocele  2. Drug screen negative  3. Antibiotics-ertapenem Per  ID-PICC line in place   4. Galactomannan and Fungitell 3/26/2022-pending  5. Cardiology following for syncopal episode  6. No bronchoscopy planned per Dr. Belinda Pruitt at this time, home on antibiotics per ID, will follow with repeat CT chest in 4 to 6 weeks. 7. Okay to discharge per pulmonology, message routed to follow-up in office-plan discussed with patient. Electronically signed by JOSÉ MANUEL Aranda CNP on 3/28/2022 at 2:49 PM    I personally saw, examined, and cared for the patient. Labs, medications, radiographs reviewed.  I agree with history exam and plans detailed in NP note with the following additions:    Would agree with outpatient IV antibiotics followed by a repeat CT in about 4 weeks  Bronchoscopy would be low yield    Electronically signed by Analisa Owens MD on 3/28/2022 at 3:49 PM

## 2022-03-28 NOTE — DISCHARGE SUMMARY
Discharge Summary    Date: 3/28/2022  Patient Name: Helio Zhu YOB: 1995 Age: 32 y.o. Admit Date: 3/25/2022  Discharge Date: 3/28/2022  Discharge Condition: Stable    Admission Diagnosis  Syncope and collapse (R55); Dehydration (E86.0); Abnormal chest CT (R93.89); Pneumatocele of lung (J98.4); Cavitary lesion of lung (J98.4); Syncope and collapse determined by examination (R55)     Discharge Diagnosis  Principal Problem: Cavitary lesion of lungActive Problems: Syncope and collapseResolved Problems: * No resolved hospital problems. [facility]  Narrative of Hospital Course:  75-year-old  male with underlying depression on fluoxetine presented after a syncopal episode in class while watching CPR video. No seizure-like activity, no head trauma, no postictal state, no prodromal state. No dehydration, orthostatic negative. Had an echo which was unrevealing and within normal limits. Unable to appreciate any activity on telemetry monitoring. Thyroid function within normal limit, U tox negative. Incidentally found a cavitary lesion in the lung without any symptoms, work-up was overall unrevealing however still being investigated with the help of pulmonology and infectious disease. Pulmonology would like to follow-up with the patient 3 to 6 weeks for repeat CT. Infectious disease had a PICC line placed and antibiotics for 3 weeks. Cardiology evaluated the patient, plan for event Holter monitor outpatient, patient will receive this on the first cardiology visit on discharge and will be followed thereafter. Patient will need to talk to his psychiatrist about fluoxetine, the effects of prolonged QTC with this medication and if there is any concern that this medication may lead to syncope.   Patient discharged home with close follow-up    Consultants:  IP CONSULT TO CARDIOLOGYIP CONSULT TO PULMONOLOGYIP CONSULT TO INFECTIOUS DISEASESIP CONSULT TO IV TEAMIP CONSULT TO PHARMACY    Surgeries/procedures Performed:       Treatments:            Discharge Plan/Disposition:  Home    Hospital/Incidental Findings Requiring Follow Up:    Patient Instructions:    Diet:    Activity:  For number of days (if applicable): Other Instructions:    Provider Follow-Up:   No follow-ups on file.      Significant Diagnostic Studies:    Recent Labs:  Admission on 03/25/2022Ventricular Rate                              Date: 03/25/2022Value: 80          Ref range: BPM                Status: FinalAtrial Rate                                   Date: 03/25/2022Value: 80          Ref range: BPM                Status: FinalP-R Interval                                  Date: 03/25/2022Value: 136         Ref range: ms                 Status: FinalQRS Duration                                  Date: 03/25/2022Value: 98          Ref range: ms                 Status: FinalQ-T Interval                                  Date: 03/25/2022Value: 374         Ref range: ms                 Status: FinalQTc Calculation (Bazett)                      Date: 03/25/2022Value: 431         Ref range: ms                 Status: FinalP Axis                                        Date: 03/25/2022Value: 73          Ref range: degrees            Status: FinalR Axis                                        Date: 03/25/2022Value: 82          Ref range: degrees            Status: FinalT Axis                                        Date: 03/25/2022Value: 65          Ref range: degrees            Status: FinalVentricular Rate                              Date: 03/25/2022Value: 65          Ref range: BPM                Status: FinalAtrial Rate                                   Date: 03/25/2022Value: 65          Ref range: BPM                Status: FinalP-R Interval                                  Date: 03/25/2022Value: 138         Ref range: ms                 Status: FinalQRS Duration                                  Date: 03/25/2022Value: 96          Ref range: ms                 Status: FinalQ-T Interval                                  Date: 03/25/2022Value: 392         Ref range: ms                 Status: FinalQTc Calculation (Bazett)                      Date: 03/25/2022Value: 407         Ref range: ms                 Status: FinalP Axis                                        Date: 03/25/2022Value: 69          Ref range: degrees            Status: FinalR Axis                                        Date: 03/25/2022Value: 72          Ref range: degrees            Status: FinalT Axis                                        Date: 03/25/2022Value: 65          Ref range: degrees            Status: FinalLactic Acid                                   Date: 03/25/2022Value: 1.9         Ref range: 0.5 - 2.2 mmol/L   Status: [address] [facility]                                           Date: 03/25/2022Value: 6.6         Ref range: 4.5 - 11.5 E9/L    Status: FinalRBC                                           Date: 03/25/2022Value: 5.58        Ref range: 3.80 - 5.80 E12/L  Status: FinalHemoglobin                                    Date: 03/25/2022Value: 16.1        Ref range: 12.5 - 16.5 g/dL   Status: FinalHematocrit                                    Date: 03/25/2022Value: 46.7        Ref range: 37.0 - 54.0 %      Status: FinalMCV                                           Date: 03/25/2022Value: 83.7        Ref range: 80.0 - 99.9 fL     Status: [address] [place]                                           Date: 03/25/2022Value: 28.9        Ref range: 26.0 - 35.0 pg     Status: [address]                                          Date: 03/25/2022Value: 34.5        Ref range: 32.0 - 34.5 %      Status: FinalRDW                                           Date: 03/25/2022Value: 12.9        Ref range: 11.5 - 15.0 fL     Status: FinalPlatelets                                     Date: 03/25/2022Value: 236         Ref range: 130 - 450 E9/L     Status: [facility] Date: 03/25/2022Value: 9.8         Ref range: 7.0 - 12.0 fL      Status: FinalNeutrophils %                                 Date: 03/25/2022Value: 68.5        Ref range: 43.0 - 80.0 %      Status: FinalImmature Granulocytes %                       Date: 03/25/2022Value: 0.3         Ref range: 0.0 - 5.0 %        Status: FinalLymphocytes %                                 Date: 03/25/2022Value: 21.7        Ref range: 20.0 - 42.0 %      Status: FinalMonocytes %                                   Date: 03/25/2022Value: 8.6         Ref range: 2.0 - 12.0 %       Status: FinalEosinophils %                                 Date: 03/25/2022Value: 0.3         Ref range: 0.0 - 6.0 %        Status: FinalBasophils %                                   Date: 03/25/2022Value: 0.6         Ref range: 0.0 - 2.0 %        Status: FinalNeutrophils Absolute                          Date: 03/25/2022Value: 4.55        Ref range: 1.80 - 7.30 E9/L   Status: FinalImmature Granulocytes #                       Date: 03/25/2022Value: 0.02        Ref range: E9/L               Status: FinalLymphocytes Absolute                          Date: 03/25/2022Value: 1.44*       Ref range: 1.50 - 4.00 E9/L   Status: FinalMonocytes Absolute                            Date: 03/25/2022Value: 0.57        Ref range: 0.10 - 0.95 E9/L   Status: FinalEosinophils Absolute                          Date: 03/25/2022Value: 0.02*       Ref range: 0.05 - 0.50 E9/L   Status: FinalBasophils Absolute                            Date: 03/25/2022Value: 0.04        Ref range: 0.00 - 0.20 E9/L   Status: FinalSodium                                        Date: 03/25/2022Value: 134         Ref range: 132 - 146 mmol/L   Status: FinalPotassium reflex Magnesium                    Date: 03/25/2022Value: 4.9         Ref range: 3.5 - 5.0 mmol/L   Status: Final              Comment: Specimen is moderately Hemolyzed. Result may be artificially increased. Chloride Date: 03/25/2022Value: 102         Ref range: 98 - 107 mmol/L    Status: FinalCO2                                           Date: 03/25/2022Value: 21*         Ref range: 22 - 29 mmol/L     Status: FinalAnion Gap                                     Date: 03/25/2022Value: 11          Ref range: 7 - 16 mmol/L      Status: FinalGlucose                                       Date: 03/25/2022Value: 118*        Ref range: 74 - 99 mg/dL      Status: FinalBUN                                           Date: 03/25/2022Value: 11          Ref range: 6 - 20 mg/dL       Status: FinalCREATININE                                    Date: 03/25/2022Value: 1.1         Ref range: 0.7 - 1.2 mg/dL    Status: FinalGFR Non-                      Date: 03/25/2022Value: >60         Ref range: >=60 mL/min/1.73   Status: Final              Comment: Chronic Kidney Disease: less than 60 ml/min/1.73 sq.m. Kidney Failure: less than 15 ml/min/1.73 sq. m. Results valid for patients 18 years and older. GFR                           Date: 03/25/2022Value: >60           Status: FinalCalcium                                       Date: 03/25/2022Value: 9.6         Ref range: 8.6 - 10.2 mg/dL   Status: FinalTotal Protein                                 Date: 03/25/2022Value: 7.6         Ref range: 6.4 - 8.3 g/dL     Status: FinalAlbumin                                       Date: 03/25/2022Value: 4.6         Ref range: 3.5 - 5.2 g/dL     Status: FinalTotal Bilirubin                               Date: 03/25/2022Value: 0.3         Ref range: 0.0 - 1.2 mg/dL    Status: FinalAlkaline Phosphatase                          Date: 03/25/2022Value: 94          Ref range: 40 - 129 U/L       Status: FinalALT                                           Date: 03/25/2022Value: 46*         Ref range: 0 - 40 U/L         Status: FinalAST                                           Date: 03/25/2022Value: 33          Ref range: 0 - 39 U/L         Status: Final              Comment: Specimen is moderately Hemolyzed. Result may be artificially increased. Glucose                                       Date: 03/25/2022Value: 112         Ref range: mg/dL              Status: FinalQC OK? Date: 03/25/2022Value: y             Status: FinalD-Dimer, Quant                                Date: 03/25/2022Value: <200        Ref range: ng/mL DDU          Status: Final              Comment: D-DIMER Interpretation:<  230  ng/mL (D-DU)  Indicates low probability for PE/DVT = 232  ng/mL (D-DU)  Upper Limit of Normal>= 4000 ng/mL (D-DU)  This level could suggest the presence                      of DIC. Clinical correlation may be                      helpful. Meter Glucose                                 Date: 03/25/2022Value: 112*        Ref range: 74 - 99 mg/dL      Status: FinalRejected Test                                 Date: 03/25/2022Value: Trop          Status: Slime Llamas for Rejection                          Date: 03/25/2022Value: see below     Status: Final              Comment: Unable to perform testing; specimen grossly hemolyzed. To perform testing the specimen will need to be recollected. HemolyzTroponin, High Sensitivity                    Date: 03/25/2022Value: <6          Ref range: 0 - 11 ng/L        Status: Final              Comment: High Sensitivity Troponin values cannot be compared withother Troponin methodologies. Patients with high levels of Biotin oral intake (i.e. >5 mg/day)may have falsely decreased Troponin levels. Samples collectedwithin 8 hours of biotin intake may require additional informationfor diagnosis. Amphetamine Screen, Urine                     Date: 03/25/2022Value: NOT DETECTED                   Ref range: Negative <1000 n*  Status: FinalBarbiturate Screen, Ur                        Date: 03/25/2022Value: NOT DETECTED                   Ref range: Negative < 200 n*  Status: Date: 03/25/2022Value: 0.916       Ref range: 0.270 - 4.200 uI*  Status: FinalSed Rate                                      Date: 03/25/2022Value: 5           Ref range: 0 - 15 mm/Hr       Status: FinalBlood Culture, Routine                        Date: 03/26/2022Value: 24 Hours no growth                     Status: PreliminaryCulture, Blood 2                              Date: 03/26/2022Value: 24 Hours no growth                     Status: PreliminarySodium                                        Date: 03/26/2022Value: 138         Ref range: 132 - 146 mmol/L   Status: FinalPotassium reflex Magnesium                    Date: 03/26/2022Value: 4.0         Ref range: 3.5 - 5.0 mmol/L   Status: FinalChloride                                      Date: 03/26/2022Value: 105         Ref range: 98 - 107 mmol/L    Status: FinalCO2                                           Date: 03/26/2022Value: 25          Ref range: 22 - 29 mmol/L     Status: FinalAnion Gap                                     Date: 03/26/2022Value: 8           Ref range: 7 - 16 mmol/L      Status: FinalGlucose                                       Date: 03/26/2022Value: 104*        Ref range: 74 - 99 mg/dL      Status: FinalBUN                                           Date: 03/26/2022Value: 10          Ref range: 6 - 20 mg/dL       Status: FinalCREATININE                                    Date: 03/26/2022Value: 1.1         Ref range: 0.7 - 1.2 mg/dL    Status: FinalGFR Non-                      Date: 03/26/2022Value: >60         Ref range: >=60 mL/min/1.73   Status: Final              Comment: Chronic Kidney Disease: less than 60 ml/min/1.73 sq.m. Kidney Failure: less than 15 ml/min/1.73 sq. m. Results valid for patients 18 years and older. GFR                           Date: 03/26/2022Value: >60           Status: FinalCalcium                                       Date: 03/26/2022Value: 8.7         Ref range: 8.6 - 10.2 mg/dL   Status: FinalTotal Protein                                 Date: 03/26/2022Value: 6.3*        Ref range: 6.4 - 8.3 g/dL     Status: FinalAlbumin                                       Date: 03/26/2022Value: 3.7         Ref range: 3.5 - 5.2 g/dL     Status: FinalTotal Bilirubin                               Date: 03/26/2022Value: 0.3         Ref range: 0.0 - 1.2 mg/dL    Status: FinalAlkaline Phosphatase                          Date: 03/26/2022Value: 71          Ref range: 40 - 129 U/L       Status: FinalALT                                           Date: 03/26/2022Value: 34          Ref range: 0 - 40 U/L         Status: FinalAST                                           Date: 03/26/2022Value: 22          Ref range: 0 - 39 U/L         Status: FinalTroponin, High Sensitivity                    Date: 03/26/2022Value: <6          Ref range: 0 - 11 ng/L        Status: Final              Comment: High Sensitivity Troponin values cannot be compared withother Troponin methodologies. Patients with high levels of Biotin oral intake (i.e. >5 mg/day)may have falsely decreased Troponin levels. Samples collectedwithin 8 hours of biotin intake may require additional informationfor diagnosis. Total CK                                      Date: 03/26/2022Value: 72          Ref range: 20 - 200 U/L       Status: FinalVentricular Rate                              Date: 03/26/2022Value: 55          Ref range: BPM                Status: IncompleteAtrial Rate                                   Date: 03/26/2022Value: 55          Ref range: BPM                Status: IncompleteP-R Interval                                  Date: 03/26/2022Value: 136         Ref range: ms                 Status: IncompleteQRS Duration                                  Date: 03/26/2022Value: 88          Ref range: ms                 Status: IncompleteQ-T Interval                                  Date: 03/26/2022Value: 404         Ref range: ms                 Status: IncompleteQTc Calculation (Bazett)                      Date: 03/26/2022Value: 386         Ref range: ms                 Status: IncompleteP Axis                                        Date: 03/26/2022Value: 74          Ref range: degrees            Status: IncompleteR Axis                                        Date: 03/26/2022Value: 64          Ref range: degrees            Status: IncompleteT Axis                                        Date: 03/26/2022Value: 49          Ref range: degrees            Status: IncompleteCRP                                           Date: 03/26/2022Value: 0.3         Ref range: 0.0 - 0.4 mg/dL    Status: FinalCholesterol, Fasting                          Date: 03/27/2022Value: 131         Ref range: 0 - 199 mg/dL      Status: FinalTriglyceride, Fasting                         Date: 03/27/2022Value: 144         Ref range: 0 - 149 mg/dL      Status: FinalHDL                                           Date: 03/27/2022Value: 31          Ref range: >40 mg/dL          Status: FinalLDL Calculated                                Date: 03/27/2022Value: 71          Ref range: 0 - 99 mg/dL       Status: FinalVLDL Cholesterol Calculated                   Date: 03/27/2022Value: 29          Ref range: mg/dL              Status: Final------------    Radiology last 7 days:  XR CHEST (2 VW)Result Date: 3/25/66202. Air-fluid level seen within the right perihilar region which could represent a cavitary lesion. Dedicated CT of the thorax is recommended with IV contrast. CT CHEST W CONTRASTResult Date: 3/25/2022Indeterminate 4.7 cm thin-walled cavitary or cystic focus with air-fluid level at the right hilar region without associated inflammatory findings adjacent or separate nodule or mass. Considerations for air-fluid level within a pneumatocele.       Pending Labs   Order Current Status  Basic Metabolic Panel w/ Reflex to MG Collected (03/25/22 2135)  CBC auto differential Collected (03/25/22 2135)  Culture, Respiratory Collected (03/26/22 1044)  Sedimentation Rate Collected (03/25/22 2135)  1,3 Beta-D-Glucan In process  Aspergillus Glacto AG Assay In process  Culture, Blood 1 Preliminary result  Culture, Blood 2 Preliminary result      Discharge Medications    Current Discharge Medication ListSTART taking these medicationsertapenem [facility]) infusionInfuse 1,000 mg intravenously every 24 hours for 21 days Compound per protocol. Qty: 21 g Refills: 0    Current Discharge Medication List    Current Discharge Medication ListCONTINUE these medications which have NOT CHANGEDFLUoxetine (PROZAC) 20 MG capsuleTake 1 capsule by mouth dailyQty: 90 capsule Refills: 1    Current Discharge Medication List    Time Spent on Discharge:3E] minutes were spent in patient examination, evaluation, counseling as well as medication reconciliation, prescriptions for required medications, discharge plan, and follow up.     Electronically signed by Anjelica Dejesus MD on 3/28/22 at 9:03 AM EDT

## 2022-03-28 NOTE — PROGRESS NOTES
[address] [place] [address] [facility]  [facility]  Progress Note    SUBJECTIVE:  Chief Complaint   Patient presents with    Loss of Consciousness     pt was said to be at work and while sitting at desk, was slouched over and \"out of it\". pt appeared to have palor present and was diaphoretic. pt states he feels fine at this time. history of vasovagal sydrome. Patient is tolerating medications. Ambulating about room. No distress  No nausea, vomiting, rashes, fevers. Review of systems:  As stated above in the chief complaint, otherwise negative. Medications:  Scheduled Meds:   sodium chloride flush  5-40 mL IntraVENous 2 times per day    heparin flush  3 mL IntraVENous 2 times per day    ertapenem (INVanz) IVPB  1,000 mg IntraVENous Q24H     Continuous Infusions:   sodium chloride       PRN Meds:sodium chloride flush, sodium chloride, heparin flush, perflutren lipid microspheres, ondansetron **OR** ondansetron, polyethylene glycol, acetaminophen **OR** acetaminophen    OBJECTIVE:  /64   Pulse 56   Temp 97.8 °F (36.6 °C) (Oral)   Resp 18   Ht 6' (1.829 m)   Wt 230 lb (104.3 kg)   SpO2 97%   BMI 31.19 kg/m²   Temp  Av.2 °F (36.8 °C)  Min: 97.8 °F (36.6 °C)  Max: 98.6 °F (37 °C)  Constitutional: The patient is awake, alert, and oriented. ambulating in room. Skin: Warm and dry. No rashes were noted. HEENT: Round and reactive pupils. Moist mucous membranes. No ulcerations or thrush. Neck: Supple to movements. Chest: No use of accessory muscles to breathe. Symmetrical expansion. Clear throughout  Cardiovascular: S1 and S2 are rhythmic and regular. No murmurs appreciated. Abdomen: Positive bowel sounds to auscultation. Benign to palpation. No masses felt. Extremities: No edema.   Lines: peripheral  PICC line right arm 3/27/65166    Laboratory and Tests Review:  Lab Results   Component Value Date    WBC 6.6 2022    HGB 16.1 2022    HCT 46.7 2022    MCV 83.7 03/25/2022     03/25/2022     Lab Results   Component Value Date    NEUTROABS 4.55 03/25/2022     No results found for: CRPHS  Lab Results   Component Value Date    ALT 34 03/26/2022    AST 22 03/26/2022    ALKPHOS 71 03/26/2022    BILITOT 0.3 03/26/2022     Lab Results   Component Value Date     03/26/2022    K 4.0 03/26/2022     03/26/2022    CO2 25 03/26/2022    BUN 10 03/26/2022    CREATININE 1.1 03/26/2022    CREATININE 1.1 03/25/2022    GFRAA >60 03/26/2022    LABGLOM >60 03/26/2022    GLUCOSE 104 03/26/2022    PROT 6.3 03/26/2022    LABALBU 3.7 03/26/2022    CALCIUM 8.7 03/26/2022    BILITOT 0.3 03/26/2022    ALKPHOS 71 03/26/2022    AST 22 03/26/2022    ALT 34 03/26/2022     Lab Results   Component Value Date    CRP 0.3 03/26/2022     Lab Results   Component Value Date    SEDRATE 5 03/25/2022     Radiology:  CT chest 3/25/2022  Impression   Indeterminate 4.7 cm thin-walled cavitary or cystic focus with air-fluid   level at the right hilar region without associated inflammatory findings   adjacent or separate nodule or mass.  Considerations for air-fluid level   within a pneumatocele. Microbiology:   Blood culture-- negative so far  Urine drug screen negative    Assessment:  · Status post SARS-CoV-2 infection in December 2021 and a fully vaccinated patient  · Cavitary lesion right hilum. Probable cavitary pneumonia  · Recurrent syncopal episode     Plan:    · Continue Ertapenem x 3 weeks - reconciled    · 1-3 beta glucan & Galactomannan pending  · Pulmonary to follow outpatient   · Ok to discharge from ID standpoint - instructed to call the office to make a follow up, will be going home with [address] [place] [address]  12:06 PM  3/28/2022     Patient seen and examined. I had a face to face encounter with the patient. Agree with exam.  Assessment and plan as outlined above and directed by me. Addition and corrections were done as deemed appropriate. My exam and plan include:  The patient is tolerating antibiotic. He is ready to go home. PICC in place. Instructed to call the office and make a follow-up appointment. Discussed with Dr. Pro Jon. Bronchoscopy will not be done now but rather have a CT as an outpatient.     Dylan Terrell MD  3/28/2022  2:32 PM

## 2022-03-28 NOTE — CARE COORDINATION
Pt lives independently with spouse; had PICC inserted; will require home IV atb's. RX written; choiced [id number] [facility]; pt agreeable to [facility]. Referral made; accepted Pt is anxious to return to work. RX faxed to [facility]. Elliot Ramos will  re; cost. Discharge order noted. Rafal Gordon. ATB coverage noted; pt will receive 2pm dose of invanz; then can discharge; care will initiate tomorrow 3/29. Rafal Gordon.

## 2022-03-28 NOTE — HOME CARE
Referral received for home health home iv. Patient is covered at 100%. Spoke with patient verified demos and discussed home health services. Received home iv script. Patient is scheduled for tomorrow start of care due to discharge from hospital in place.

## 2022-03-29 ENCOUNTER — NURSE ONLY (OUTPATIENT)
Dept: CARDIOLOGY CLINIC | Age: 27
End: 2022-03-29

## 2022-03-29 LAB
(1,3)-BETA-D-GLUCAN (FUNGITELL) INTERPRETATION: NORMAL
(1,3)-BETA-D-GLUCAN (FUNGITELL): NORMAL PG/ML
ASPERGILLUS GALACTO AG: NEGATIVE
ASPERGILLUS GALACTO INDEX: 0.04

## 2022-03-31 LAB
BLOOD CULTURE, ROUTINE: NORMAL
CULTURE, BLOOD 2: NORMAL

## 2022-04-01 RX ORDER — HEPARIN SODIUM (PORCINE) LOCK FLUSH IV SOLN 100 UNIT/ML 100 UNIT/ML
500 SOLUTION INTRAVENOUS PRN
Status: CANCELLED | OUTPATIENT
Start: 2022-04-01

## 2022-04-01 RX ORDER — SODIUM CHLORIDE 0.9 % (FLUSH) 0.9 %
10 SYRINGE (ML) INJECTION PRN
Status: CANCELLED | OUTPATIENT
Start: 2022-04-01

## 2022-04-04 ENCOUNTER — HOSPITAL ENCOUNTER (OUTPATIENT)
Dept: INFUSION THERAPY | Age: 27
Setting detail: INFUSION SERIES
Discharge: HOME | End: 2022-04-04
Payer: COMMERCIAL

## 2022-04-04 VITALS
OXYGEN SATURATION: 99 % | HEART RATE: 64 BPM | TEMPERATURE: 97.1 F | SYSTOLIC BLOOD PRESSURE: 126 MMHG | RESPIRATION RATE: 12 BRPM | DIASTOLIC BLOOD PRESSURE: 77 MMHG

## 2022-04-04 DIAGNOSIS — J98.4 CAVITARY LESION OF LUNG: Primary | ICD-10-CM

## 2022-04-04 LAB
ALBUMIN SERPL-MCNC: 4.3 G/DL (ref 3.5–5.2)
ALP BLD-CCNC: 83 U/L (ref 40–129)
ALT SERPL-CCNC: 40 U/L (ref 0–40)
AST SERPL-CCNC: 27 U/L (ref 0–39)
BASOPHILS ABSOLUTE: 0.03 E9/L (ref 0–0.2)
BASOPHILS RELATIVE PERCENT: 0.5 % (ref 0–2)
BILIRUB SERPL-MCNC: 0.5 MG/DL (ref 0–1.2)
BILIRUBIN DIRECT: <0.2 MG/DL (ref 0–0.3)
BILIRUBIN, INDIRECT: NORMAL MG/DL (ref 0–1)
BUN BLDV-MCNC: 11 MG/DL (ref 6–20)
C-REACTIVE PROTEIN: 0.3 MG/DL (ref 0–0.4)
CREAT SERPL-MCNC: 1.1 MG/DL (ref 0.7–1.2)
EOSINOPHILS ABSOLUTE: 0.05 E9/L (ref 0.05–0.5)
EOSINOPHILS RELATIVE PERCENT: 0.9 % (ref 0–6)
GFR AFRICAN AMERICAN: >60
GFR NON-AFRICAN AMERICAN: >60 ML/MIN/1.73
HCT VFR BLD CALC: 43.1 % (ref 37–54)
HEMOGLOBIN: 14.9 G/DL (ref 12.5–16.5)
IMMATURE GRANULOCYTES #: 0.01 E9/L
IMMATURE GRANULOCYTES %: 0.2 % (ref 0–5)
LYMPHOCYTES ABSOLUTE: 2.05 E9/L (ref 1.5–4)
LYMPHOCYTES RELATIVE PERCENT: 35.8 % (ref 20–42)
MCH RBC QN AUTO: 28.7 PG (ref 26–35)
MCHC RBC AUTO-ENTMCNC: 34.6 % (ref 32–34.5)
MCV RBC AUTO: 83 FL (ref 80–99.9)
MONOCYTES ABSOLUTE: 0.51 E9/L (ref 0.1–0.95)
MONOCYTES RELATIVE PERCENT: 8.9 % (ref 2–12)
NEUTROPHILS ABSOLUTE: 3.08 E9/L (ref 1.8–7.3)
NEUTROPHILS RELATIVE PERCENT: 53.7 % (ref 43–80)
PDW BLD-RTO: 12.7 FL (ref 11.5–15)
PLATELET # BLD: 215 E9/L (ref 130–450)
PMV BLD AUTO: 9.5 FL (ref 7–12)
RBC # BLD: 5.19 E12/L (ref 3.8–5.8)
SEDIMENTATION RATE, ERYTHROCYTE: 6 MM/HR (ref 0–15)
TOTAL PROTEIN: 7.5 G/DL (ref 6.4–8.3)
WBC # BLD: 5.7 E9/L (ref 4.5–11.5)

## 2022-04-04 PROCEDURE — 80076 HEPATIC FUNCTION PANEL: CPT

## 2022-04-04 PROCEDURE — 36592 COLLECT BLOOD FROM PICC: CPT

## 2022-04-04 PROCEDURE — 86140 C-REACTIVE PROTEIN: CPT

## 2022-04-04 PROCEDURE — 99211 OFF/OP EST MAY X REQ PHY/QHP: CPT

## 2022-04-04 PROCEDURE — 6360000002 HC RX W HCPCS: Performed by: SPECIALIST

## 2022-04-04 PROCEDURE — 36415 COLL VENOUS BLD VENIPUNCTURE: CPT

## 2022-04-04 PROCEDURE — 2580000003 HC RX 258: Performed by: SPECIALIST

## 2022-04-04 PROCEDURE — 85025 COMPLETE CBC W/AUTO DIFF WBC: CPT

## 2022-04-04 PROCEDURE — 85651 RBC SED RATE NONAUTOMATED: CPT

## 2022-04-04 PROCEDURE — 84520 ASSAY OF UREA NITROGEN: CPT

## 2022-04-04 PROCEDURE — 82565 ASSAY OF CREATININE: CPT

## 2022-04-04 RX ORDER — SODIUM CHLORIDE 0.9 % (FLUSH) 0.9 %
10 SYRINGE (ML) INJECTION PRN
Status: DISCONTINUED | OUTPATIENT
Start: 2022-04-04 | End: 2022-04-05 | Stop reason: HOSPADM

## 2022-04-04 RX ORDER — HEPARIN SODIUM (PORCINE) LOCK FLUSH IV SOLN 100 UNIT/ML 100 UNIT/ML
500 SOLUTION INTRAVENOUS PRN
Status: DISCONTINUED | OUTPATIENT
Start: 2022-04-04 | End: 2022-04-05 | Stop reason: HOSPADM

## 2022-04-04 RX ORDER — HEPARIN SODIUM (PORCINE) LOCK FLUSH IV SOLN 100 UNIT/ML 100 UNIT/ML
500 SOLUTION INTRAVENOUS PRN
Status: CANCELLED | OUTPATIENT
Start: 2022-04-07

## 2022-04-04 RX ORDER — SODIUM CHLORIDE 0.9 % (FLUSH) 0.9 %
10 SYRINGE (ML) INJECTION PRN
Status: CANCELLED | OUTPATIENT
Start: 2022-04-07

## 2022-04-04 RX ADMIN — SODIUM CHLORIDE, PRESERVATIVE FREE 10 ML: 5 INJECTION INTRAVENOUS at 15:37

## 2022-04-04 RX ADMIN — SODIUM CHLORIDE, PRESERVATIVE FREE 10 ML: 5 INJECTION INTRAVENOUS at 15:35

## 2022-04-04 RX ADMIN — HEPARIN 500 UNITS: 100 SYRINGE at 15:37

## 2022-04-04 ASSESSMENT — PAIN SCALES - GENERAL: PAINLEVEL_OUTOF10: 0

## 2022-04-04 NOTE — PROGRESS NOTES
Patient tolerated PICC dressing change and blood work well. Patient alert and oriented x3. No distress noted. Vital signs stable. Patient denies any new or worsening pain. Offered patient education an/or discharge material.  Patient declined. Patient denies any needs. All questions answered.

## 2022-04-06 ENCOUNTER — OFFICE VISIT (OUTPATIENT)
Dept: FAMILY MEDICINE CLINIC | Age: 27
End: 2022-04-06
Payer: COMMERCIAL

## 2022-04-06 VITALS
OXYGEN SATURATION: 98 % | BODY MASS INDEX: 29.39 KG/M2 | RESPIRATION RATE: 16 BRPM | TEMPERATURE: 97.4 F | HEART RATE: 80 BPM | WEIGHT: 217 LBS | SYSTOLIC BLOOD PRESSURE: 82 MMHG | HEIGHT: 72 IN | DIASTOLIC BLOOD PRESSURE: 60 MMHG

## 2022-04-06 DIAGNOSIS — J98.4 CAVITARY LESION OF LUNG: ICD-10-CM

## 2022-04-06 DIAGNOSIS — Z11.59 ENCOUNTER FOR HEPATITIS C SCREENING TEST FOR LOW RISK PATIENT: ICD-10-CM

## 2022-04-06 DIAGNOSIS — R55 SYNCOPE, UNSPECIFIED SYNCOPE TYPE: Primary | ICD-10-CM

## 2022-04-06 DIAGNOSIS — Z11.4 ENCOUNTER FOR SCREENING FOR HIV: ICD-10-CM

## 2022-04-06 PROCEDURE — 1111F DSCHRG MED/CURRENT MED MERGE: CPT | Performed by: FAMILY MEDICINE

## 2022-04-06 PROCEDURE — G8417 CALC BMI ABV UP PARAM F/U: HCPCS | Performed by: FAMILY MEDICINE

## 2022-04-06 PROCEDURE — 1036F TOBACCO NON-USER: CPT | Performed by: FAMILY MEDICINE

## 2022-04-06 PROCEDURE — 99214 OFFICE O/P EST MOD 30 MIN: CPT | Performed by: FAMILY MEDICINE

## 2022-04-06 PROCEDURE — G8427 DOCREV CUR MEDS BY ELIG CLIN: HCPCS | Performed by: FAMILY MEDICINE

## 2022-04-06 ASSESSMENT — ENCOUNTER SYMPTOMS
SINUS PAIN: 0
GASTROINTESTINAL NEGATIVE: 1
COUGH: 0
PHOTOPHOBIA: 0
SHORTNESS OF BREATH: 0
EYE DISCHARGE: 0
EYE REDNESS: 0
RHINORRHEA: 0

## 2022-04-06 NOTE — PROGRESS NOTES
2022    Presley Florez    Chief Complaint   Patient presents with    Other     cyst on lung discharge 3/28/2025 Pt collapsed at work. He also on heart monitior. HPI  History was obtained from patient  Zulay Zuñiga is a 32 y.o. male who presents today with the followin. Syncope, unspecified syncope type    2. Encounter for hepatitis C screening test for low risk patient    3. Encounter for screening for HIV    4. Cavitary lesion of lung       Patient is here for hospital follow-up after a syncopal episode. Patient had work-up in the hospital including EKG echocardiogram and cardiac monitoring. Patient had no events after been admitted to the hospital.  He did have one short event while in the emergency room but I do not see any corresponding rhythm strip for that event. Patient states he feels fine now. He is currently on IV antibiotics at home with a PICC line. He is to finish 3 weeks of this antibiotic. He has follow-up appointments with pulmonology, cardiology and infectious disease. REVIEW OF SYMPTOMS    Review of Systems   Constitutional: Negative for chills, fatigue and fever. HENT: Negative for congestion, mouth sores, postnasal drip, rhinorrhea and sinus pain. Eyes: Negative for photophobia, discharge and redness. Respiratory: Negative for cough and shortness of breath. Cardiovascular: Negative for chest pain. Gastrointestinal: Negative. Genitourinary: Negative for difficulty urinating. Neurological: Negative for headaches.        PAST MEDICAL HISTORY  Past Medical History:   Diagnosis Date    Depression        FAMILY HISTORY  Family History   Problem Relation Age of Onset    Cancer Paternal Grandmother     Heart Attack Paternal Grandfather     Heart Disease Paternal Grandfather     Diabetes Paternal Grandfather        SOCIAL HISTORY  Social History     Socioeconomic History    Marital status: Single     Spouse name: None    Number of children: None    Years of education: None    Highest education level: None   Occupational History    None   Tobacco Use    Smoking status: Former Smoker     Packs/day: 0.50     Years: 1.00     Pack years: 0.50     Types: Cigarettes    Smokeless tobacco: Never Used    Tobacco comment: socially during parties   Vaping Use    Vaping Use: Never used   Substance and Sexual Activity    Alcohol use: Yes     Alcohol/week: 3.0 standard drinks     Types: 3 Cans of beer per week     Comment: 3 drinks per week    Drug use: Never    Sexual activity: None   Other Topics Concern    None   Social History Narrative    None     Social Determinants of Health     Financial Resource Strain: Low Risk     Difficulty of Paying Living Expenses: Not hard at all   Food Insecurity: No Food Insecurity    Worried About Running Out of Food in the Last Year: Never true    Annabelle of Food in the Last Year: Never true   Transportation Needs:     Lack of Transportation (Medical): Not on file    Lack of Transportation (Non-Medical):  Not on file   Physical Activity:     Days of Exercise per Week: Not on file    Minutes of Exercise per Session: Not on file   Stress:     Feeling of Stress : Not on file   Social Connections:     Frequency of Communication with Friends and Family: Not on file    Frequency of Social Gatherings with Friends and Family: Not on file    Attends Buddhist Services: Not on file    Active Member of [address] [place] [address] or Organizations: Not on file    Attends Club or Organization Meetings: Not on file    Marital Status: Not on file   Intimate Partner Violence:     Fear of Current or Ex-Partner: Not on file    Emotionally Abused: Not on file    Physically Abused: Not on file    Sexually Abused: Not on file   Housing Stability:     Unable to Pay for Housing in the Last Year: Not on file    Number of [place] in the Last Year: Not on file    Unstable Housing in the Last Year: Not on file        SURGICAL HISTORY  Past Surgical History: Procedure Laterality Date    [facility]         KNEE SURGERY  2008    unsure thinks R knee                 CURRENT MEDICATIONS  Current Outpatient Medications   Medication Sig Dispense Refill    ertapenem (INVANZ) infusion Infuse 1,000 mg intravenously every 24 hours for 21 days Compound per protocol. 21 g 0    FLUoxetine (PROZAC) 20 MG capsule Take 1 capsule by mouth daily 90 capsule 1     No current facility-administered medications for this visit. ALLERGIES  Allergies   Allergen Reactions    Latex Itching    Sulfa Antibiotics Rash       PHYSICAL EXAM    BP 82/60   Pulse 80   Temp 97.4 °F (36.3 °C)   Resp 16   Ht 6' (1.829 m)   Wt 217 lb (98.4 kg)   SpO2 98%   BMI 29.43 kg/m²     Physical Exam  Vitals and nursing note reviewed. Constitutional:       Appearance: Normal appearance. HENT:      Head: Normocephalic and atraumatic. Nose: No congestion or rhinorrhea. Mouth/Throat:      Mouth: Mucous membranes are moist.      Pharynx: Oropharynx is clear. Eyes:      Conjunctiva/sclera: Conjunctivae normal.   Cardiovascular:      Rate and Rhythm: Normal rate and regular rhythm. Heart sounds: Normal heart sounds. Pulmonary:      Effort: Pulmonary effort is normal.      Breath sounds: Normal breath sounds. Musculoskeletal:      Cervical back: Neck supple. Skin:     General: Skin is warm. Neurological:      Mental Status: He is alert and oriented to person, place, and time. Psychiatric:         Mood and Affect: Mood normal.         ASSESSMENT & PLAN    Ginette Gray was seen today for other. Diagnoses and all orders for this visit:    Syncope, unspecified syncope type    Encounter for hepatitis C screening test for low risk patient  -     Hepatitis C Antibody; Future    Encounter for screening for HIV  -     HIV Screen;  Future    Cavitary lesion of lung    Patient will finish up his 3 weeks of antibiotics and follow-up with his specialist.  Patient has had no episodes of syncope since discharge from the hospital.  We will follow along with specialist.    Return in about 3 months (around 7/6/2022). Electronically signed by Mohsen Huynh DO on 4/6/2022

## 2022-04-07 ENCOUNTER — HOSPITAL ENCOUNTER (OUTPATIENT)
Dept: INFUSION THERAPY | Age: 27
Setting detail: INFUSION SERIES
Discharge: HOME | End: 2022-04-07
Payer: COMMERCIAL

## 2022-04-07 VITALS
DIASTOLIC BLOOD PRESSURE: 72 MMHG | SYSTOLIC BLOOD PRESSURE: 110 MMHG | HEART RATE: 89 BPM | TEMPERATURE: 97.5 F | OXYGEN SATURATION: 99 % | RESPIRATION RATE: 16 BRPM

## 2022-04-07 DIAGNOSIS — J98.4 CAVITARY LESION OF LUNG: Primary | ICD-10-CM

## 2022-04-07 LAB
ALBUMIN SERPL-MCNC: 4.5 G/DL (ref 3.5–5.2)
ALP BLD-CCNC: 89 U/L (ref 40–129)
ALT SERPL-CCNC: 45 U/L (ref 0–40)
AST SERPL-CCNC: 31 U/L (ref 0–39)
BASOPHILS ABSOLUTE: 0.03 E9/L (ref 0–0.2)
BASOPHILS RELATIVE PERCENT: 0.5 % (ref 0–2)
BILIRUB SERPL-MCNC: 0.4 MG/DL (ref 0–1.2)
BILIRUBIN DIRECT: <0.2 MG/DL (ref 0–0.3)
BILIRUBIN, INDIRECT: ABNORMAL MG/DL (ref 0–1)
BUN BLDV-MCNC: 12 MG/DL (ref 6–20)
CREAT SERPL-MCNC: 1.2 MG/DL (ref 0.7–1.2)
EOSINOPHILS ABSOLUTE: 0.08 E9/L (ref 0.05–0.5)
EOSINOPHILS RELATIVE PERCENT: 1.4 % (ref 0–6)
GFR AFRICAN AMERICAN: >60
GFR NON-AFRICAN AMERICAN: >60 ML/MIN/1.73
HCT VFR BLD CALC: 43.3 % (ref 37–54)
HEMOGLOBIN: 14.9 G/DL (ref 12.5–16.5)
IMMATURE GRANULOCYTES #: 0.01 E9/L
IMMATURE GRANULOCYTES %: 0.2 % (ref 0–5)
LYMPHOCYTES ABSOLUTE: 2.12 E9/L (ref 1.5–4)
LYMPHOCYTES RELATIVE PERCENT: 37.8 % (ref 20–42)
MCH RBC QN AUTO: 28.7 PG (ref 26–35)
MCHC RBC AUTO-ENTMCNC: 34.4 % (ref 32–34.5)
MCV RBC AUTO: 83.4 FL (ref 80–99.9)
MONOCYTES ABSOLUTE: 0.48 E9/L (ref 0.1–0.95)
MONOCYTES RELATIVE PERCENT: 8.6 % (ref 2–12)
NEUTROPHILS ABSOLUTE: 2.89 E9/L (ref 1.8–7.3)
NEUTROPHILS RELATIVE PERCENT: 51.5 % (ref 43–80)
PDW BLD-RTO: 12.4 FL (ref 11.5–15)
PLATELET # BLD: 239 E9/L (ref 130–450)
PMV BLD AUTO: 9.5 FL (ref 7–12)
RBC # BLD: 5.19 E12/L (ref 3.8–5.8)
TOTAL PROTEIN: 7.5 G/DL (ref 6.4–8.3)
WBC # BLD: 5.6 E9/L (ref 4.5–11.5)

## 2022-04-07 PROCEDURE — 36415 COLL VENOUS BLD VENIPUNCTURE: CPT

## 2022-04-07 PROCEDURE — 2580000003 HC RX 258: Performed by: SPECIALIST

## 2022-04-07 PROCEDURE — 82565 ASSAY OF CREATININE: CPT

## 2022-04-07 PROCEDURE — 6360000002 HC RX W HCPCS: Performed by: SPECIALIST

## 2022-04-07 PROCEDURE — 85025 COMPLETE CBC W/AUTO DIFF WBC: CPT

## 2022-04-07 PROCEDURE — 84520 ASSAY OF UREA NITROGEN: CPT

## 2022-04-07 PROCEDURE — 36592 COLLECT BLOOD FROM PICC: CPT

## 2022-04-07 PROCEDURE — 80076 HEPATIC FUNCTION PANEL: CPT

## 2022-04-07 RX ORDER — SODIUM CHLORIDE 0.9 % (FLUSH) 0.9 %
10 SYRINGE (ML) INJECTION PRN
Status: CANCELLED | OUTPATIENT
Start: 2022-04-11

## 2022-04-07 RX ORDER — HEPARIN SODIUM (PORCINE) LOCK FLUSH IV SOLN 100 UNIT/ML 100 UNIT/ML
500 SOLUTION INTRAVENOUS PRN
Status: CANCELLED | OUTPATIENT
Start: 2022-04-11

## 2022-04-07 RX ORDER — HEPARIN SODIUM (PORCINE) LOCK FLUSH IV SOLN 100 UNIT/ML 100 UNIT/ML
500 SOLUTION INTRAVENOUS PRN
Status: DISCONTINUED | OUTPATIENT
Start: 2022-04-07 | End: 2022-04-08 | Stop reason: HOSPADM

## 2022-04-07 RX ORDER — SODIUM CHLORIDE 0.9 % (FLUSH) 0.9 %
10 SYRINGE (ML) INJECTION PRN
Status: DISCONTINUED | OUTPATIENT
Start: 2022-04-07 | End: 2022-04-08 | Stop reason: HOSPADM

## 2022-04-07 RX ADMIN — HEPARIN 500 UNITS: 100 SYRINGE at 15:37

## 2022-04-07 RX ADMIN — SODIUM CHLORIDE, PRESERVATIVE FREE 10 ML: 5 INJECTION INTRAVENOUS at 15:37

## 2022-04-12 ENCOUNTER — HOSPITAL ENCOUNTER (OUTPATIENT)
Dept: INFUSION THERAPY | Age: 27
Setting detail: INFUSION SERIES
Discharge: HOME | End: 2022-04-12
Payer: COMMERCIAL

## 2022-04-12 VITALS
RESPIRATION RATE: 16 BRPM | DIASTOLIC BLOOD PRESSURE: 71 MMHG | SYSTOLIC BLOOD PRESSURE: 119 MMHG | HEART RATE: 55 BPM | TEMPERATURE: 97.4 F | OXYGEN SATURATION: 99 %

## 2022-04-12 DIAGNOSIS — J98.4 CAVITARY LESION OF LUNG: Primary | ICD-10-CM

## 2022-04-12 LAB
ALBUMIN SERPL-MCNC: 4.3 G/DL (ref 3.5–5.2)
ALP BLD-CCNC: 82 U/L (ref 40–129)
ALT SERPL-CCNC: 37 U/L (ref 0–40)
AST SERPL-CCNC: 23 U/L (ref 0–39)
BASOPHILS ABSOLUTE: 0.03 E9/L (ref 0–0.2)
BASOPHILS RELATIVE PERCENT: 0.5 % (ref 0–2)
BILIRUB SERPL-MCNC: 0.6 MG/DL (ref 0–1.2)
BILIRUBIN DIRECT: <0.2 MG/DL (ref 0–0.3)
BILIRUBIN, INDIRECT: NORMAL MG/DL (ref 0–1)
BUN BLDV-MCNC: 12 MG/DL (ref 6–20)
C-REACTIVE PROTEIN: 0.3 MG/DL (ref 0–0.4)
CREAT SERPL-MCNC: 1.1 MG/DL (ref 0.7–1.2)
EOSINOPHILS ABSOLUTE: 0.06 E9/L (ref 0.05–0.5)
EOSINOPHILS RELATIVE PERCENT: 1 % (ref 0–6)
GFR AFRICAN AMERICAN: >60
GFR NON-AFRICAN AMERICAN: >60 ML/MIN/1.73
HCT VFR BLD CALC: 43 % (ref 37–54)
HEMOGLOBIN: 14.7 G/DL (ref 12.5–16.5)
IMMATURE GRANULOCYTES #: 0.01 E9/L
IMMATURE GRANULOCYTES %: 0.2 % (ref 0–5)
LYMPHOCYTES ABSOLUTE: 2.23 E9/L (ref 1.5–4)
LYMPHOCYTES RELATIVE PERCENT: 37.9 % (ref 20–42)
MCH RBC QN AUTO: 28.9 PG (ref 26–35)
MCHC RBC AUTO-ENTMCNC: 34.2 % (ref 32–34.5)
MCV RBC AUTO: 84.5 FL (ref 80–99.9)
MONOCYTES ABSOLUTE: 0.39 E9/L (ref 0.1–0.95)
MONOCYTES RELATIVE PERCENT: 6.6 % (ref 2–12)
NEUTROPHILS ABSOLUTE: 3.16 E9/L (ref 1.8–7.3)
NEUTROPHILS RELATIVE PERCENT: 53.8 % (ref 43–80)
PDW BLD-RTO: 12.3 FL (ref 11.5–15)
PLATELET # BLD: 220 E9/L (ref 130–450)
PMV BLD AUTO: 9.5 FL (ref 7–12)
RBC # BLD: 5.09 E12/L (ref 3.8–5.8)
SEDIMENTATION RATE, ERYTHROCYTE: 3 MM/HR (ref 0–15)
TOTAL PROTEIN: 7.1 G/DL (ref 6.4–8.3)
WBC # BLD: 5.9 E9/L (ref 4.5–11.5)

## 2022-04-12 PROCEDURE — 36592 COLLECT BLOOD FROM PICC: CPT

## 2022-04-12 PROCEDURE — 85651 RBC SED RATE NONAUTOMATED: CPT

## 2022-04-12 PROCEDURE — 6360000002 HC RX W HCPCS

## 2022-04-12 PROCEDURE — 82565 ASSAY OF CREATININE: CPT

## 2022-04-12 PROCEDURE — 85025 COMPLETE CBC W/AUTO DIFF WBC: CPT

## 2022-04-12 PROCEDURE — 86140 C-REACTIVE PROTEIN: CPT

## 2022-04-12 PROCEDURE — 36415 COLL VENOUS BLD VENIPUNCTURE: CPT

## 2022-04-12 PROCEDURE — 99211 OFF/OP EST MAY X REQ PHY/QHP: CPT

## 2022-04-12 PROCEDURE — 80076 HEPATIC FUNCTION PANEL: CPT

## 2022-04-12 PROCEDURE — 2580000003 HC RX 258: Performed by: SPECIALIST

## 2022-04-12 PROCEDURE — 84520 ASSAY OF UREA NITROGEN: CPT

## 2022-04-12 RX ORDER — SODIUM CHLORIDE 0.9 % (FLUSH) 0.9 %
10 SYRINGE (ML) INJECTION PRN
Status: CANCELLED | OUTPATIENT
Start: 2022-04-14

## 2022-04-12 RX ORDER — SODIUM CHLORIDE 0.9 % (FLUSH) 0.9 %
10 SYRINGE (ML) INJECTION PRN
Status: DISCONTINUED | OUTPATIENT
Start: 2022-04-12 | End: 2022-04-13 | Stop reason: HOSPADM

## 2022-04-12 RX ORDER — HEPARIN SODIUM (PORCINE) LOCK FLUSH IV SOLN 100 UNIT/ML 100 UNIT/ML
500 SOLUTION INTRAVENOUS PRN
Status: CANCELLED | OUTPATIENT
Start: 2022-04-14

## 2022-04-12 RX ORDER — HEPARIN SODIUM (PORCINE) LOCK FLUSH IV SOLN 100 UNIT/ML 100 UNIT/ML
500 SOLUTION INTRAVENOUS PRN
Status: DISCONTINUED | OUTPATIENT
Start: 2022-04-12 | End: 2022-04-13 | Stop reason: HOSPADM

## 2022-04-12 RX ORDER — HEPARIN SODIUM (PORCINE) LOCK FLUSH IV SOLN 100 UNIT/ML 100 UNIT/ML
SOLUTION INTRAVENOUS
Status: COMPLETED
Start: 2022-04-12 | End: 2022-04-12

## 2022-04-12 RX ADMIN — HEPARIN SODIUM (PORCINE) LOCK FLUSH IV SOLN 100 UNIT/ML 500 UNITS: 100 SOLUTION at 15:40

## 2022-04-12 RX ADMIN — SODIUM CHLORIDE, PRESERVATIVE FREE 10 ML: 5 INJECTION INTRAVENOUS at 15:40

## 2022-04-12 RX ADMIN — HEPARIN 500 UNITS: 100 SYRINGE at 15:40

## 2022-04-14 ENCOUNTER — HOSPITAL ENCOUNTER (OUTPATIENT)
Dept: INFUSION THERAPY | Age: 27
Setting detail: INFUSION SERIES
Discharge: HOME | End: 2022-04-14

## 2022-04-14 DIAGNOSIS — J98.4 CAVITARY LESION OF LUNG: Primary | ICD-10-CM

## 2022-04-14 RX ORDER — HEPARIN SODIUM (PORCINE) LOCK FLUSH IV SOLN 100 UNIT/ML 100 UNIT/ML
500 SOLUTION INTRAVENOUS PRN
Status: DISCONTINUED | OUTPATIENT
Start: 2022-04-14 | End: 2022-04-15 | Stop reason: HOSPADM

## 2022-04-14 RX ORDER — HEPARIN SODIUM (PORCINE) LOCK FLUSH IV SOLN 100 UNIT/ML 100 UNIT/ML
500 SOLUTION INTRAVENOUS PRN
Status: CANCELLED | OUTPATIENT
Start: 2022-04-18

## 2022-04-14 RX ORDER — SODIUM CHLORIDE 0.9 % (FLUSH) 0.9 %
10 SYRINGE (ML) INJECTION PRN
Status: DISCONTINUED | OUTPATIENT
Start: 2022-04-14 | End: 2022-04-15 | Stop reason: HOSPADM

## 2022-04-14 RX ORDER — SODIUM CHLORIDE 0.9 % (FLUSH) 0.9 %
10 SYRINGE (ML) INJECTION PRN
Status: CANCELLED | OUTPATIENT
Start: 2022-04-18

## 2022-04-18 ENCOUNTER — HOSPITAL ENCOUNTER (OUTPATIENT)
Dept: INFUSION THERAPY | Age: 27
Setting detail: INFUSION SERIES
Discharge: HOME | End: 2022-04-18
Payer: COMMERCIAL

## 2022-04-18 VITALS
HEART RATE: 51 BPM | TEMPERATURE: 96.9 F | SYSTOLIC BLOOD PRESSURE: 106 MMHG | DIASTOLIC BLOOD PRESSURE: 67 MMHG | RESPIRATION RATE: 16 BRPM

## 2022-04-18 PROCEDURE — 99211 OFF/OP EST MAY X REQ PHY/QHP: CPT

## 2022-04-18 NOTE — PROGRESS NOTES
Discharged to home in stable condition, picc line pulled intact , VS stable, dry sterile dressing applied

## 2022-04-22 DIAGNOSIS — R55 SYNCOPE, UNSPECIFIED SYNCOPE TYPE: ICD-10-CM

## 2022-04-22 DIAGNOSIS — R00.1 SINUS BRADYCARDIA: ICD-10-CM

## 2022-04-29 NOTE — RESULT ENCOUNTER NOTE
Tell Jarocho that monitor showed few brief episodes (about 2 seconds) of fast heart rates.   Avoid caffeine

## 2022-05-02 ENCOUNTER — TELEPHONE (OUTPATIENT)
Dept: CARDIOLOGY CLINIC | Age: 27
End: 2022-05-02

## 2022-05-04 ENCOUNTER — HOSPITAL ENCOUNTER (OUTPATIENT)
Dept: CT IMAGING | Age: 27
Discharge: HOME | End: 2022-05-06
Payer: COMMERCIAL

## 2022-05-04 DIAGNOSIS — J98.4 CAVITARY LESION OF LUNG: ICD-10-CM

## 2022-05-04 PROCEDURE — 71250 CT THORAX DX C-: CPT

## 2022-06-01 RX ORDER — FLUOXETINE HYDROCHLORIDE 20 MG/1
CAPSULE ORAL
Qty: 90 CAPSULE | Refills: 1 | Status: SHIPPED | OUTPATIENT
Start: 2022-06-01

## 2022-09-09 PROBLEM — R05.9 COUGH: Status: ACTIVE | Noted: 2022-09-09

## 2022-10-09 PROBLEM — R05.9 COUGH: Status: RESOLVED | Noted: 2022-09-09 | Resolved: 2022-10-09

## 2022-11-26 RX ORDER — FLUOXETINE HYDROCHLORIDE 20 MG/1
CAPSULE ORAL
Qty: 90 CAPSULE | Refills: 1 | OUTPATIENT
Start: 2022-11-26

## 2022-12-14 ENCOUNTER — HOSPITAL ENCOUNTER (OUTPATIENT)
Dept: CT IMAGING | Age: 27
Discharge: HOME | End: 2022-12-16
Payer: COMMERCIAL

## 2022-12-14 DIAGNOSIS — J98.4 PULMONARY CAVITARY LESION: ICD-10-CM

## 2022-12-14 PROCEDURE — 71250 CT THORAX DX C-: CPT

## 2023-03-28 ENCOUNTER — TELEPHONE (OUTPATIENT)
Dept: FAMILY MEDICINE CLINIC | Age: 28
End: 2023-03-28

## 2023-03-28 NOTE — TELEPHONE ENCOUNTER
Pt woke up at 5am today went to the Kitchen to get something to eat and woke up on the floor. Went back to bed, got up at 6:45, showered and felt like passing out again, but did not. Is at work now and is feeling okay. This has happened a year ago and in the past.    He has an appt. On Monday 4/3, to address the problem.

## 2023-04-03 ENCOUNTER — OFFICE VISIT (OUTPATIENT)
Dept: FAMILY MEDICINE CLINIC | Age: 28
End: 2023-04-03
Payer: COMMERCIAL

## 2023-04-03 VITALS
TEMPERATURE: 98.1 F | RESPIRATION RATE: 19 BRPM | HEIGHT: 72 IN | BODY MASS INDEX: 30.48 KG/M2 | SYSTOLIC BLOOD PRESSURE: 118 MMHG | OXYGEN SATURATION: 97 % | WEIGHT: 225 LBS | DIASTOLIC BLOOD PRESSURE: 84 MMHG | HEART RATE: 88 BPM

## 2023-04-03 DIAGNOSIS — R40.20 LOC (LOSS OF CONSCIOUSNESS) (HCC): Primary | ICD-10-CM

## 2023-04-03 PROCEDURE — G8427 DOCREV CUR MEDS BY ELIG CLIN: HCPCS | Performed by: FAMILY MEDICINE

## 2023-04-03 PROCEDURE — 1036F TOBACCO NON-USER: CPT | Performed by: FAMILY MEDICINE

## 2023-04-03 PROCEDURE — G8417 CALC BMI ABV UP PARAM F/U: HCPCS | Performed by: FAMILY MEDICINE

## 2023-04-03 PROCEDURE — 99213 OFFICE O/P EST LOW 20 MIN: CPT | Performed by: FAMILY MEDICINE

## 2023-04-03 SDOH — ECONOMIC STABILITY: INCOME INSECURITY: HOW HARD IS IT FOR YOU TO PAY FOR THE VERY BASICS LIKE FOOD, HOUSING, MEDICAL CARE, AND HEATING?: NOT HARD AT ALL

## 2023-04-03 SDOH — ECONOMIC STABILITY: FOOD INSECURITY: WITHIN THE PAST 12 MONTHS, YOU WORRIED THAT YOUR FOOD WOULD RUN OUT BEFORE YOU GOT MONEY TO BUY MORE.: NEVER TRUE

## 2023-04-03 SDOH — ECONOMIC STABILITY: HOUSING INSECURITY
IN THE LAST 12 MONTHS, WAS THERE A TIME WHEN YOU DID NOT HAVE A STEADY PLACE TO SLEEP OR SLEPT IN A SHELTER (INCLUDING NOW)?: NO

## 2023-04-03 SDOH — ECONOMIC STABILITY: FOOD INSECURITY: WITHIN THE PAST 12 MONTHS, THE FOOD YOU BOUGHT JUST DIDN'T LAST AND YOU DIDN'T HAVE MONEY TO GET MORE.: NEVER TRUE

## 2023-04-03 ASSESSMENT — PATIENT HEALTH QUESTIONNAIRE - PHQ9
SUM OF ALL RESPONSES TO PHQ QUESTIONS 1-9: 0
5. POOR APPETITE OR OVEREATING: 0
7. TROUBLE CONCENTRATING ON THINGS, SUCH AS READING THE NEWSPAPER OR WATCHING TELEVISION: 0
3. TROUBLE FALLING OR STAYING ASLEEP: 0
2. FEELING DOWN, DEPRESSED OR HOPELESS: 0
SUM OF ALL RESPONSES TO PHQ QUESTIONS 1-9: 0
4. FEELING TIRED OR HAVING LITTLE ENERGY: 0
SUM OF ALL RESPONSES TO PHQ QUESTIONS 1-9: 0
6. FEELING BAD ABOUT YOURSELF - OR THAT YOU ARE A FAILURE OR HAVE LET YOURSELF OR YOUR FAMILY DOWN: 0
SUM OF ALL RESPONSES TO PHQ9 QUESTIONS 1 & 2: 0
10. IF YOU CHECKED OFF ANY PROBLEMS, HOW DIFFICULT HAVE THESE PROBLEMS MADE IT FOR YOU TO DO YOUR WORK, TAKE CARE OF THINGS AT HOME, OR GET ALONG WITH OTHER PEOPLE: 0
8. MOVING OR SPEAKING SO SLOWLY THAT OTHER PEOPLE COULD HAVE NOTICED. OR THE OPPOSITE, BEING SO FIGETY OR RESTLESS THAT YOU HAVE BEEN MOVING AROUND A LOT MORE THAN USUAL: 0
SUM OF ALL RESPONSES TO PHQ QUESTIONS 1-9: 0
1. LITTLE INTEREST OR PLEASURE IN DOING THINGS: 0
9. THOUGHTS THAT YOU WOULD BE BETTER OFF DEAD, OR OF HURTING YOURSELF: 0

## 2023-04-03 NOTE — PROGRESS NOTES
2023    Hussein Btoello    Chief Complaint   Patient presents with    Loss of Consciousness     Since last Thursday        HPI  History was obtained from patient. Nikki Saavedra is a 32 y.o. male who presents today with the followin. LOC (loss of consciousness) [facility])       Patient presents for evaluation of loss of consciousness. Patient states that on 3/28/2023 he woke up at 5 AM and went into his kitchen to get something to eat. He states he then woke up on the floor and did not know how long he was unconscious. Patient did go back to bed and got up at [phone] and had a near syncopal episode. Patient states he has felt fine ever since. Patient states this happened a year ago and in the more distant past.  Patient had a complete cardiac work-up with no findings to account for his loss of consciousness. REVIEW OF SYMPTOMS    Review of Systems   Constitutional:  Negative for chills, fatigue and fever. HENT:  Negative for congestion, mouth sores, postnasal drip, rhinorrhea and sinus pain. Eyes:  Negative for photophobia, discharge and redness. Respiratory:  Negative for cough and shortness of breath. Cardiovascular:  Negative for chest pain. Gastrointestinal: Negative. Genitourinary:  Negative for difficulty urinating, dysuria, hematuria and urgency. Neurological:  Positive for syncope. Negative for headaches. Psychiatric/Behavioral:  Negative for sleep disturbance.       PAST MEDICAL HISTORY  Past Medical History:   Diagnosis Date    Depression        FAMILY HISTORY  Family History   Problem Relation Age of Onset    Cancer Paternal Grandmother     Heart Attack Paternal Grandfather     Heart Disease Paternal Grandfather     Diabetes Paternal Grandfather        SOCIAL HISTORY  Social History     Socioeconomic History    Marital status: Single     Spouse name: None    Number of children: None    Years of education: None    Highest education level: None   Tobacco Use    Smoking status: Former

## 2023-04-09 ASSESSMENT — ENCOUNTER SYMPTOMS
RHINORRHEA: 0
PHOTOPHOBIA: 0
GASTROINTESTINAL NEGATIVE: 1
SINUS PAIN: 0
COUGH: 0
SHORTNESS OF BREATH: 0
EYE REDNESS: 0
EYE DISCHARGE: 0

## 2024-01-22 ENCOUNTER — OFFICE VISIT (OUTPATIENT)
Dept: FAMILY MEDICINE CLINIC | Age: 29
End: 2024-01-22
Payer: COMMERCIAL

## 2024-01-22 VITALS
TEMPERATURE: 98 F | HEART RATE: 80 BPM | BODY MASS INDEX: 29.66 KG/M2 | HEIGHT: 72 IN | SYSTOLIC BLOOD PRESSURE: 100 MMHG | WEIGHT: 219 LBS | DIASTOLIC BLOOD PRESSURE: 62 MMHG | OXYGEN SATURATION: 98 %

## 2024-01-22 DIAGNOSIS — F32.A DEPRESSION, UNSPECIFIED DEPRESSION TYPE: Primary | ICD-10-CM

## 2024-01-22 PROCEDURE — G8484 FLU IMMUNIZE NO ADMIN: HCPCS | Performed by: FAMILY MEDICINE

## 2024-01-22 PROCEDURE — G8427 DOCREV CUR MEDS BY ELIG CLIN: HCPCS | Performed by: FAMILY MEDICINE

## 2024-01-22 PROCEDURE — G8417 CALC BMI ABV UP PARAM F/U: HCPCS | Performed by: FAMILY MEDICINE

## 2024-01-22 PROCEDURE — 1036F TOBACCO NON-USER: CPT | Performed by: FAMILY MEDICINE

## 2024-01-22 PROCEDURE — 99213 OFFICE O/P EST LOW 20 MIN: CPT | Performed by: FAMILY MEDICINE

## 2024-01-22 RX ORDER — FLUOXETINE HYDROCHLORIDE 20 MG/1
CAPSULE ORAL
Qty: 90 CAPSULE | Refills: 1 | Status: SHIPPED | OUTPATIENT
Start: 2024-01-22

## 2024-01-22 ASSESSMENT — PATIENT HEALTH QUESTIONNAIRE - PHQ9
SUM OF ALL RESPONSES TO PHQ9 QUESTIONS 1 & 2: 0
2. FEELING DOWN, DEPRESSED OR HOPELESS: 0
1. LITTLE INTEREST OR PLEASURE IN DOING THINGS: 0
SUM OF ALL RESPONSES TO PHQ QUESTIONS 1-9: 0

## 2024-01-22 NOTE — PROGRESS NOTES
2024    Jarocho Scott    Chief Complaint   Patient presents with    Medication Refill       HPI  History was obtained from patient.  Jarocho is a 28 y.o. male who presents today with the followin. Depression, unspecified depression type    Patient presents for follow-up of depression.  Patient is currently taking Prozac 20 mg and states this is working well for him without side effects.  Patient has no acute complaints today.     REVIEW OF SYMPTOMS    Review of Systems   Constitutional:  Negative for chills, fatigue and fever.   HENT:  Negative for congestion, mouth sores, postnasal drip, rhinorrhea and sinus pain.    Eyes:  Negative for photophobia, discharge and redness.   Respiratory:  Negative for cough and shortness of breath.    Cardiovascular:  Negative for chest pain.   Gastrointestinal: Negative.    Genitourinary:  Negative for difficulty urinating, dysuria, hematuria and urgency.   Neurological:  Negative for headaches.   Psychiatric/Behavioral:  Negative for sleep disturbance.        PAST MEDICAL HISTORY  Past Medical History:   Diagnosis Date    Depression        FAMILY HISTORY  Family History   Problem Relation Age of Onset    Cancer Paternal Grandmother     Heart Attack Paternal Grandfather     Heart Disease Paternal Grandfather     Diabetes Paternal Grandfather        SOCIAL HISTORY  Social History     Socioeconomic History    Marital status: Single     Spouse name: None    Number of children: None    Years of education: None    Highest education level: None   Tobacco Use    Smoking status: Former     Current packs/day: 0.00     Average packs/day: 0.5 packs/day for 1 year (0.5 ttl pk-yrs)     Types: Cigarettes     Start date:      Quit date: 2017     Years since quittin.0    Smokeless tobacco: Never    Tobacco comments:     socially during parties   Vaping Use    Vaping Use: Never used    Passive vaping exposure: Yes   Substance and Sexual Activity    Alcohol use: Yes

## 2024-01-30 ASSESSMENT — ENCOUNTER SYMPTOMS
EYE REDNESS: 0
COUGH: 0
SHORTNESS OF BREATH: 0
GASTROINTESTINAL NEGATIVE: 1
RHINORRHEA: 0
SINUS PAIN: 0
PHOTOPHOBIA: 0
EYE DISCHARGE: 0

## 2024-02-23 ENCOUNTER — HOSPITAL ENCOUNTER (OUTPATIENT)
Dept: CT IMAGING | Age: 29
Discharge: HOME | End: 2024-02-23
Attending: INTERNAL MEDICINE
Payer: COMMERCIAL

## 2024-02-23 DIAGNOSIS — J98.4 CAVITARY LESION OF LUNG: ICD-10-CM

## 2024-02-23 PROCEDURE — 71250 CT THORAX DX C-: CPT

## 2024-05-28 NOTE — TELEPHONE ENCOUNTER
Patient had hospital follow up tomorrow tht he cannot keep and to discuss monitor. Results of monitor given over the phone. Please advise on follow up or if needed.
Spoke to patient regarding above
Tell him his monitor showed \"brief episodes of fast heart rates and slow heart rates at night\"  Avoid caffeine and drink more fluids  Call if recurrent heart racing or passes out - then [facility]
Yes

## 2024-07-22 ENCOUNTER — OFFICE VISIT (OUTPATIENT)
Dept: FAMILY MEDICINE CLINIC | Age: 29
End: 2024-07-22

## 2024-07-22 ENCOUNTER — HOSPITAL ENCOUNTER (OUTPATIENT)
Age: 29
Discharge: HOME | End: 2024-07-22
Payer: COMMERCIAL

## 2024-07-22 VITALS
BODY MASS INDEX: 29.12 KG/M2 | HEIGHT: 72 IN | HEART RATE: 70 BPM | WEIGHT: 215 LBS | OXYGEN SATURATION: 98 % | SYSTOLIC BLOOD PRESSURE: 101 MMHG | TEMPERATURE: 97.5 F | DIASTOLIC BLOOD PRESSURE: 69 MMHG

## 2024-07-22 DIAGNOSIS — J45.909 MILD ASTHMA WITHOUT COMPLICATION, UNSPECIFIED WHETHER PERSISTENT: ICD-10-CM

## 2024-07-22 DIAGNOSIS — G25.81 RESTLESS LEGS: ICD-10-CM

## 2024-07-22 DIAGNOSIS — F32.A DEPRESSION, UNSPECIFIED DEPRESSION TYPE: Primary | ICD-10-CM

## 2024-07-22 DIAGNOSIS — R61 HYPERHIDROSIS: ICD-10-CM

## 2024-07-22 LAB
BASOPHILS # BLD: 0.02 K/UL (ref 0–0.2)
BASOPHILS NFR BLD: 0 % (ref 0–2)
EOSINOPHIL # BLD: 0.07 K/UL (ref 0.05–0.5)
EOSINOPHILS RELATIVE PERCENT: 1 % (ref 0–6)
ERYTHROCYTE [DISTWIDTH] IN BLOOD BY AUTOMATED COUNT: 12.4 % (ref 11.5–15)
FERRITIN SERPL-MCNC: 161 NG/ML
HCT VFR BLD AUTO: 45.2 % (ref 37–54)
HGB BLD-MCNC: 15.7 G/DL (ref 12.5–16.5)
IMM GRANULOCYTES # BLD AUTO: <0.03 K/UL (ref 0–0.58)
IMM GRANULOCYTES NFR BLD: 0 % (ref 0–5)
IRON SATN MFR SERPL: 31 % (ref 20–55)
IRON SERPL-MCNC: 102 UG/DL (ref 59–158)
LYMPHOCYTES NFR BLD: 2.01 K/UL (ref 1.5–4)
LYMPHOCYTES RELATIVE PERCENT: 32 % (ref 20–42)
MCH RBC QN AUTO: 29.1 PG (ref 26–35)
MCHC RBC AUTO-ENTMCNC: 34.7 G/DL (ref 32–34.5)
MCV RBC AUTO: 83.9 FL (ref 80–99.9)
MONOCYTES NFR BLD: 0.52 K/UL (ref 0.1–0.95)
MONOCYTES NFR BLD: 8 % (ref 2–12)
NEUTROPHILS NFR BLD: 58 % (ref 43–80)
NEUTS SEG NFR BLD: 3.65 K/UL (ref 1.8–7.3)
PLATELET # BLD AUTO: 224 K/UL (ref 130–450)
PMV BLD AUTO: 8.9 FL (ref 7–12)
RBC # BLD AUTO: 5.39 M/UL (ref 3.8–5.8)
TIBC SERPL-MCNC: 333 UG/DL (ref 250–450)
TSH SERPL DL<=0.05 MIU/L-ACNC: 1.53 UIU/ML (ref 0.27–4.2)
WBC OTHER # BLD: 6.3 K/UL (ref 4.5–11.5)

## 2024-07-22 PROCEDURE — 84443 ASSAY THYROID STIM HORMONE: CPT

## 2024-07-22 PROCEDURE — 83550 IRON BINDING TEST: CPT

## 2024-07-22 PROCEDURE — 36415 COLL VENOUS BLD VENIPUNCTURE: CPT

## 2024-07-22 PROCEDURE — 85025 COMPLETE CBC W/AUTO DIFF WBC: CPT

## 2024-07-22 PROCEDURE — 82728 ASSAY OF FERRITIN: CPT

## 2024-07-22 PROCEDURE — 83540 ASSAY OF IRON: CPT

## 2024-07-22 RX ORDER — FLUOXETINE HYDROCHLORIDE 40 MG/1
CAPSULE ORAL
Qty: 30 CAPSULE | Refills: 0 | Status: SHIPPED | OUTPATIENT
Start: 2024-07-22

## 2024-07-22 RX ORDER — HYDROXYZINE HYDROCHLORIDE 25 MG/1
TABLET, FILM COATED ORAL
COMMUNITY
Start: 2024-06-12

## 2024-07-22 RX ORDER — ALBUTEROL SULFATE 90 UG/1
2 AEROSOL, METERED RESPIRATORY (INHALATION) 4 TIMES DAILY PRN
Qty: 18 G | Refills: 0 | Status: SHIPPED | OUTPATIENT
Start: 2024-07-22

## 2024-07-22 NOTE — PROGRESS NOTES
2024    Jarocho Scott    Chief Complaint   Patient presents with    6 Month Follow-Up    Depression       HPI  History was obtained from patient.  Jarocho is a 28 y.o. male who presents today with the followin. Depression, unspecified depression type    2. Restless legs    3. Mild asthma without complication, unspecified whether persistent    4. Hyperhidrosis    Patient presents for follow-up of depression.  Patient is taking Prozac 40 mg daily and this is working well for him.  He also occasionally takes hydroxyzine.  Patient also complains of restless legs.  Patient states this is been a slowly increasing problem for him that disturbs his sleep.  Is also had problems with excessive sweating recently although this has been a long-term problem is worse in the last couple months.     REVIEW OF SYMPTOMS    Review of Systems   Constitutional:  Negative for chills, fatigue and fever.   HENT:  Negative for congestion, mouth sores, postnasal drip, rhinorrhea and sinus pain.    Eyes:  Negative for photophobia, discharge and redness.   Respiratory:  Negative for cough and shortness of breath.    Cardiovascular:  Negative for chest pain.   Gastrointestinal: Negative.    Genitourinary:  Negative for difficulty urinating, dysuria, hematuria and urgency.   Neurological:  Negative for headaches.   Psychiatric/Behavioral:  Negative for sleep disturbance.        PAST MEDICAL HISTORY  Past Medical History:   Diagnosis Date    Depression        FAMILY HISTORY  Family History   Problem Relation Age of Onset    Cancer Paternal Grandmother     Heart Attack Paternal Grandfather     Heart Disease Paternal Grandfather     Diabetes Paternal Grandfather        SOCIAL HISTORY  Social History     Socioeconomic History    Marital status: Single     Spouse name: None    Number of children: None    Years of education: None    Highest education level: None   Tobacco Use    Smoking status: Former     Current packs/day: 0.00     Average

## 2025-02-01 ENCOUNTER — HOSPITAL ENCOUNTER (EMERGENCY)
Age: 30
Discharge: HOME | End: 2025-02-01
Payer: COMMERCIAL

## 2025-02-01 ENCOUNTER — APPOINTMENT (OUTPATIENT)
Dept: GENERAL RADIOLOGY | Age: 30
End: 2025-02-01
Payer: COMMERCIAL

## 2025-02-01 VITALS
HEART RATE: 89 BPM | OXYGEN SATURATION: 99 % | TEMPERATURE: 98 F | SYSTOLIC BLOOD PRESSURE: 126 MMHG | RESPIRATION RATE: 16 BRPM | DIASTOLIC BLOOD PRESSURE: 78 MMHG | HEIGHT: 72 IN | BODY MASS INDEX: 29.12 KG/M2 | WEIGHT: 215 LBS

## 2025-02-01 DIAGNOSIS — S92.115A CLOSED NONDISPLACED FRACTURE OF NECK OF LEFT TALUS, INITIAL ENCOUNTER: Primary | ICD-10-CM

## 2025-02-01 DIAGNOSIS — S93.402A SPRAIN OF LEFT ANKLE, UNSPECIFIED LIGAMENT, INITIAL ENCOUNTER: ICD-10-CM

## 2025-02-01 PROCEDURE — 99283 EMERGENCY DEPT VISIT LOW MDM: CPT

## 2025-02-01 PROCEDURE — 73590 X-RAY EXAM OF LOWER LEG: CPT

## 2025-02-01 PROCEDURE — 73610 X-RAY EXAM OF ANKLE: CPT

## 2025-02-01 PROCEDURE — 29515 APPLICATION SHORT LEG SPLINT: CPT

## 2025-02-01 PROCEDURE — 6370000000 HC RX 637 (ALT 250 FOR IP)

## 2025-02-01 RX ORDER — IBUPROFEN 800 MG/1
800 TABLET, FILM COATED ORAL ONCE
Status: COMPLETED | OUTPATIENT
Start: 2025-02-01 | End: 2025-02-01

## 2025-02-01 RX ADMIN — IBUPROFEN 800 MG: 800 TABLET, FILM COATED ORAL at 19:19

## 2025-02-01 ASSESSMENT — PAIN SCALES - GENERAL
PAINLEVEL_OUTOF10: 6
PAINLEVEL_OUTOF10: 10
PAINLEVEL_OUTOF10: 5

## 2025-02-01 ASSESSMENT — PAIN DESCRIPTION - ONSET
ONSET: ON-GOING
ONSET: ON-GOING

## 2025-02-01 ASSESSMENT — PAIN DESCRIPTION - ORIENTATION
ORIENTATION: LEFT

## 2025-02-01 ASSESSMENT — PAIN DESCRIPTION - DESCRIPTORS
DESCRIPTORS: ACHING;DISCOMFORT;SORE
DESCRIPTORS: ACHING
DESCRIPTORS: ACHING

## 2025-02-01 ASSESSMENT — PAIN DESCRIPTION - PAIN TYPE: TYPE: ACUTE PAIN

## 2025-02-01 ASSESSMENT — PAIN DESCRIPTION - FREQUENCY
FREQUENCY: CONTINUOUS
FREQUENCY: CONTINUOUS

## 2025-02-01 ASSESSMENT — PAIN - FUNCTIONAL ASSESSMENT
PAIN_FUNCTIONAL_ASSESSMENT: ACTIVITIES ARE NOT PREVENTED
PAIN_FUNCTIONAL_ASSESSMENT: 0-10
PAIN_FUNCTIONAL_ASSESSMENT: 0-10

## 2025-02-01 ASSESSMENT — PAIN DESCRIPTION - LOCATION
LOCATION: ANKLE

## 2025-02-01 ASSESSMENT — LIFESTYLE VARIABLES
HOW OFTEN DO YOU HAVE A DRINK CONTAINING ALCOHOL: NEVER
HOW MANY STANDARD DRINKS CONTAINING ALCOHOL DO YOU HAVE ON A TYPICAL DAY: PATIENT DOES NOT DRINK

## 2025-02-02 NOTE — ED PROVIDER NOTES
Independent ZA Visit.      [facility]  Department of Emergency Medicine   ED  Encounter Note  Admit Date/RoomTime: 2025  6:48 PM  ED Room: SEBAS/SEBAS    NAME: Jarocho Scott  : 1995  MRN: [id number]     Chief Complaint:  Ankle Pain (Left ankle pain, landed wrong while at adult gymnastics )    History of Present Illness         Jarocho Scott is a 29 y.o. old male presenting to the emergency department by private vehicle, for traumatic Left ankle pain which occured several minute(s) prior to arrival.  The complaint is due to inversion injury while he was taking an adult gymnastics class.  Since onset the symptoms have been remaining constant with ability to bear weight, but with some pain, pain at the medial and lateral aspect of the ankle, and swelling.  Patient  has a prior history of pain to mentioned area related to today's visit, Patient has a prior history of injury to mentioned area related to today's visit. His pain is aggraveated by certain movements, pressure on or palpation of painful area, or weight bearing and relieved by nothing, as no treatment has been provided prior to this visit.  He denies any head injury, headache, loss of consciousness, confusion, dizziness, neck pain, chest pain, abdominal pain, back pain, numbness, weakness, blurred vision, nausea, vomiting, fever, chills, wounds, or rash.  On assessment, patient is in no acute distress    ROS   Pertinent positives and negatives are stated within HPI, all other systems reviewed and are negative.    Past Medical History:  has a past medical history of Depression.    Surgical History:  has a past surgical history that includes   pic powerpic single (3/27/2022) and knee surgery ().    Social History:  reports that he quit smoking about 8 years ago. His smoking use included cigarettes. He started smoking about 9 years ago. He has a 0.5 pack-year smoking history. He has never used smokeless tobacco. He reports

## 2025-02-02 NOTE — DISCHARGE INSTRUCTIONS
XR ANKLE LEFT (MIN 3 VIEWS)   Final Result   No acute fractures in the left tibia and fibula.      In the oblique projection there is a yisel bone avulsion from the dorsal   medial aspect of the neck of the talus of undetermined age.  Please correlate   clinically with point tenderness.      No other fracture seen in the area of the left ankle.  No dislocation of the.         XR TIBIA FIBULA LEFT (2 VIEWS)   Final Result   No acute fractures in the left tibia and fibula.      In the oblique projection there is a yisel bone avulsion from the dorsal   medial aspect of the neck of the talus of undetermined age.  Please correlate   clinically with point tenderness.      No other fracture seen in the area of the left ankle.  No dislocation of the.         Alternate between Tylenol and ibuprofen for pain control.  Keep elevated is much as possible.  You can apply ice to top of your ankle and foot for 15 to 20 minutes at a time, multiple times a day.  Please call Dr. Perales Monday to discuss follow-up and wound.  Please call your PCP as well to discuss ER visit and findings on x-ray.  He develop any new or concerning symptoms, return to the emergency department for reevaluation.

## 2025-02-03 ENCOUNTER — TELEPHONE (OUTPATIENT)
Dept: ORTHOPEDIC SURGERY | Age: 30
End: 2025-02-03

## 2025-02-03 NOTE — TELEPHONE ENCOUNTER
Recommend referral to Sports Medicine provider, Dr. Lokesh Tanner with [facility] or Dr. Mateo Stephenson with [facility]

## 2025-02-03 NOTE — TELEPHONE ENCOUNTER
Patient called office requesting appointment for ED follow up.    ED visit date:2/1/2025    ED Location: [place] [facility]    Diagnosis/Injury: Closed nondisplaced fracture of neck of left talus,     Provider on call: Dr. Zurdo Perales DO    Routed to providers for recommendations.    No future appointments.

## 2025-02-06 ENCOUNTER — OFFICE VISIT (OUTPATIENT)
Dept: ORTHOPEDIC SURGERY | Age: 30
End: 2025-02-06
Payer: COMMERCIAL

## 2025-02-06 VITALS
HEART RATE: 73 BPM | DIASTOLIC BLOOD PRESSURE: 78 MMHG | HEIGHT: 72 IN | WEIGHT: 215 LBS | BODY MASS INDEX: 29.12 KG/M2 | TEMPERATURE: 97.3 F | OXYGEN SATURATION: 98 % | SYSTOLIC BLOOD PRESSURE: 121 MMHG | RESPIRATION RATE: 18 BRPM

## 2025-02-06 DIAGNOSIS — S93.409A SPRAIN OF LATERAL LIGAMENT OF ANKLE JOINT: ICD-10-CM

## 2025-02-06 DIAGNOSIS — M25.572 ACUTE LEFT ANKLE PAIN: Primary | ICD-10-CM

## 2025-02-06 PROCEDURE — 99203 OFFICE O/P NEW LOW 30 MIN: CPT | Performed by: FAMILY MEDICINE

## 2025-02-06 PROCEDURE — G8417 CALC BMI ABV UP PARAM F/U: HCPCS | Performed by: FAMILY MEDICINE

## 2025-02-06 PROCEDURE — G8427 DOCREV CUR MEDS BY ELIG CLIN: HCPCS | Performed by: FAMILY MEDICINE

## 2025-02-06 PROCEDURE — 1036F TOBACCO NON-USER: CPT | Performed by: FAMILY MEDICINE

## 2025-02-06 NOTE — PROGRESS NOTES
[facility]  PRIMARY CARE SPORTS MEDICINE  DATE OF VISIT : 2025    Patient : Jarocho Scott  Age : 29 y.o.   : 1995  MRN : [id number]   ______________________________________________________________________    Chief Complaint :   Chief Complaint   Patient presents with    Ankle Pain     Left  day  went to adult gymnastic class  rolled it outward    went to ER   splinted  and crutches  patient is toe        HPI : Jarocho Scott is 29 y.o. male who presented to the clinic today for evaluation of left ankle pain.  Onset of symptoms was 5 days ago after an inversion injury sustained in a beginners adult gymnastics class.  Current symptoms include pain, swelling and ecchymosis of the left ankle.  Symptoms are exacerbated by all weightbearing activity.  Evaluation to date: XRs of the left ankle which demonstrate a small minimally displaced avulsion fracture of the talus without acute dislocation.  Treatment to date: Activity modification, splinting and OTC medication which are somewhat effective.    Past Medical History :  Past Medical History:   Diagnosis Date    Depression      Past Surgical History:   Procedure Laterality Date      PICC POWERPIC SINGLE  3/27/2022         KNEE SURGERY      unsure thinks R knee       Allergies :   Allergies   Allergen Reactions    Latex Itching    Sulfa Antibiotics Rash       Medication List :    Current Outpatient Medications   Medication Sig Dispense Refill    hydrOXYzine HCl (ATARAX) 25 MG tablet TAKE 1 TABLET BY MOUTH ONCE A DAY AS NEEDED AT BEDTIME      FLUoxetine (PROZAC) 40 MG capsule TAKE 1 CAPSULE BY MOUTH EVERY DAY 30 capsule 0    albuterol sulfate HFA (VENTOLIN HFA) 108 (90 Base) MCG/ACT inhaler Inhale 2 puffs into the lungs 4 times daily as needed for Wheezing 18 g 0     No current facility-administered medications for this visit.      ______________________________________________________________________    Physical Exam :    Vitals: /78 (Site: Right

## 2025-03-06 ENCOUNTER — OFFICE VISIT (OUTPATIENT)
Dept: ORTHOPEDIC SURGERY | Age: 30
End: 2025-03-06
Payer: COMMERCIAL

## 2025-03-06 VITALS
DIASTOLIC BLOOD PRESSURE: 80 MMHG | TEMPERATURE: 97.7 F | WEIGHT: 215 LBS | SYSTOLIC BLOOD PRESSURE: 122 MMHG | BODY MASS INDEX: 29.12 KG/M2 | HEIGHT: 72 IN | HEART RATE: 59 BPM | RESPIRATION RATE: 16 BRPM | OXYGEN SATURATION: 97 %

## 2025-03-06 DIAGNOSIS — M25.572 ACUTE LEFT ANKLE PAIN: ICD-10-CM

## 2025-03-06 DIAGNOSIS — S93.409A SPRAIN OF LATERAL LIGAMENT OF ANKLE JOINT: Primary | ICD-10-CM

## 2025-03-06 PROCEDURE — 99213 OFFICE O/P EST LOW 20 MIN: CPT | Performed by: FAMILY MEDICINE

## 2025-03-06 PROCEDURE — G8427 DOCREV CUR MEDS BY ELIG CLIN: HCPCS | Performed by: FAMILY MEDICINE

## 2025-03-06 PROCEDURE — G8417 CALC BMI ABV UP PARAM F/U: HCPCS | Performed by: FAMILY MEDICINE

## 2025-03-06 PROCEDURE — 1036F TOBACCO NON-USER: CPT | Performed by: FAMILY MEDICINE

## 2025-03-06 NOTE — PROGRESS NOTES
[facility]  PRIMARY CARE SPORTS MEDICINE  DATE OF VISIT : 3/6/2025    Patient : Jarocho Scott  Age : 29 y.o.   : 1995  MRN : [id number]   ______________________________________________________________________    Chief Complaint :   Chief Complaint   Patient presents with    Follow-up     Left ankle   is wearing boot  felt ok to move in circles  and was challenging   has been putting wieght on 1-2 weeks now  feels good        HPI : Jarocho Scott is 29 y.o. male who presented to the clinic today for follow-up of left ankle avulsion fracture.  Patient has been compliant with current treatment plan of walking boot with significant reduction in pain since previous visit.  Patient does endorse some stiffness of the ankle especially with mobility exercises.    Past Medical History :  Past Medical History:   Diagnosis Date    Depression      Past Surgical History:   Procedure Laterality Date    [facility] POWERPIC SINGLE  3/27/2022         KNEE SURGERY      unsure thinks R knee       Allergies :   Allergies   Allergen Reactions    Latex Itching    Sulfa Antibiotics Rash       Medication List :    Current Outpatient Medications   Medication Sig Dispense Refill    hydrOXYzine HCl (ATARAX) 25 MG tablet TAKE 1 TABLET BY MOUTH ONCE A DAY AS NEEDED AT BEDTIME      FLUoxetine (PROZAC) 40 MG capsule TAKE 1 CAPSULE BY MOUTH EVERY DAY 30 capsule 0    albuterol sulfate HFA (VENTOLIN HFA) 108 (90 Base) MCG/ACT inhaler Inhale 2 puffs into the lungs 4 times daily as needed for Wheezing 18 g 0     No current facility-administered medications for this visit.      ______________________________________________________________________    Physical Exam :    Vitals: /80 (Site: Right Upper Arm, Position: Sitting, Cuff Size: Medium Adult)   Pulse 59   Temp 97.7 °F (36.5 °C) (Infrared)   Resp 16   Ht 1.829 m (6')   Wt 97.5 kg (215 lb)   SpO2 97%   BMI 29.16 kg/m²   General Appearance: Nontoxic, awake, alert, and in no acute

## 2025-06-10 DIAGNOSIS — J45.909 MILD ASTHMA WITHOUT COMPLICATION, UNSPECIFIED WHETHER PERSISTENT: ICD-10-CM

## 2025-06-10 NOTE — TELEPHONE ENCOUNTER
Please send a script to [facility] Mail order, its Albuterol Inhaler he uses as needed, and has not ordered in awhile

## 2025-06-10 NOTE — TELEPHONE ENCOUNTER
Name of Medication(s) Requested:  Requested Prescriptions     Pending Prescriptions Disp Refills    albuterol sulfate HFA (VENTOLIN HFA) 108 (90 Base) MCG/ACT inhaler 18 g 0     Sig: Inhale 2 puffs into the lungs 4 times daily as needed for Wheezing       Medication is on current medication list Yes    Dosage and directions were verified? Yes    Quantity verified: 30 day supply     Pharmacy Verified?  Yes    Last Appointment:  7/22/2024    Future appts:  No future appointments.     (If no appt send self scheduling link. .REFILLAPPT)  Scheduling request sent?     [x] Yes  [] No    Does patient need updated?  [] Yes  [x] No

## 2025-06-11 RX ORDER — ALBUTEROL SULFATE 90 UG/1
2 INHALANT RESPIRATORY (INHALATION) 4 TIMES DAILY PRN
Qty: 18 G | Refills: 0 | Status: SHIPPED | OUTPATIENT
Start: 2025-06-11 | End: 2025-06-12

## 2025-06-12 DIAGNOSIS — J45.909 MILD ASTHMA WITHOUT COMPLICATION, UNSPECIFIED WHETHER PERSISTENT: ICD-10-CM

## 2025-06-12 RX ORDER — ALBUTEROL SULFATE 90 UG/1
2 INHALANT RESPIRATORY (INHALATION) EVERY 4 HOURS PRN
Qty: 1 EACH | Refills: 3 | Status: SHIPPED | OUTPATIENT
Start: 2025-06-12

## 2025-06-12 NOTE — TELEPHONE ENCOUNTER
Name of Medication(s) Requested:  Requested Prescriptions     Pending Prescriptions Disp Refills    albuterol sulfate HFA (PROVENTIL;VENTOLIN;PROAIR) 108 (90 Base) MCG/ACT inhaler [Pharmacy Med Name: ALBUTEROL AER HFA (PA)]  3       Medication is on current medication list Yes    Dosage and directions were verified? Yes    Quantity verified: 30 day supply     Pharmacy Verified?  Yes    Last Appointment:  7/22/2024    Future appts:  No future appointments.     (If no appt send self scheduling link. .REFILLAPPT)  Scheduling request sent?     [x] Yes  [] No    Does patient need updated?  [] Yes  [x] No